# Patient Record
Sex: FEMALE | Race: WHITE | NOT HISPANIC OR LATINO | Employment: UNEMPLOYED | URBAN - METROPOLITAN AREA
[De-identification: names, ages, dates, MRNs, and addresses within clinical notes are randomized per-mention and may not be internally consistent; named-entity substitution may affect disease eponyms.]

---

## 2024-08-11 ENCOUNTER — LAB REQUISITION (OUTPATIENT)
Dept: LAB | Facility: HOSPITAL | Age: 44
End: 2024-08-11

## 2024-08-11 DIAGNOSIS — Z79.899 OTHER LONG TERM (CURRENT) DRUG THERAPY: ICD-10-CM

## 2024-08-11 LAB
AMPHETAMINES UR QL SCN: ABNORMAL
BARBITURATES UR QL SCN: ABNORMAL
BENZODIAZ UR QL SCN: ABNORMAL
BZE UR QL SCN: ABNORMAL
CANNABINOIDS UR QL SCN: ABNORMAL
FENTANYL+NORFENTANYL UR QL SCN: ABNORMAL
METHADONE UR QL SCN: ABNORMAL
OPIATES UR QL SCN: ABNORMAL
OXYCODONE+OXYMORPHONE UR QL SCN: ABNORMAL
PCP UR QL SCN: ABNORMAL

## 2024-08-11 PROCEDURE — 80355 GABAPENTIN NON-BLOOD: CPT | Mod: OUT | Performed by: NURSE PRACTITIONER

## 2024-08-11 PROCEDURE — 80307 DRUG TEST PRSMV CHEM ANLYZR: CPT | Mod: OUT | Performed by: NURSE PRACTITIONER

## 2024-08-12 ENCOUNTER — PHARMACY VISIT (OUTPATIENT)
Dept: PHARMACY | Facility: CLINIC | Age: 44
End: 2024-08-12
Payer: MEDICARE

## 2024-08-12 PROCEDURE — RXMED WILLOW AMBULATORY MEDICATION CHARGE

## 2024-08-12 RX ORDER — DIPHENHYDRAMINE HCL 25 MG
CAPSULE ORAL
Qty: 12 CAPSULE | Refills: 0 | OUTPATIENT
Start: 2024-08-12

## 2024-08-12 RX ORDER — PROMETHAZINE HYDROCHLORIDE 12.5 MG/1
TABLET ORAL
Qty: 20 TABLET | Refills: 0 | OUTPATIENT
Start: 2024-08-12

## 2024-08-12 RX ORDER — LEVOTHYROXINE SODIUM 175 UG/1
175 TABLET ORAL EVERY MORNING
Qty: 7 TABLET | Refills: 0 | OUTPATIENT
Start: 2024-08-12 | End: 2024-08-14 | Stop reason: SDUPTHER

## 2024-08-12 RX ORDER — LEVETIRACETAM 500 MG/1
500 TABLET ORAL 2 TIMES DAILY
Qty: 14 TABLET | Refills: 0 | OUTPATIENT
Start: 2024-08-12

## 2024-08-12 RX ORDER — DICYCLOMINE HYDROCHLORIDE 20 MG/1
TABLET ORAL
Qty: 10 TABLET | Refills: 0 | OUTPATIENT
Start: 2024-08-12

## 2024-08-12 RX ORDER — TALC
POWDER (GRAM) TOPICAL
Qty: 10 TABLET | Refills: 0 | OUTPATIENT
Start: 2024-08-12

## 2024-08-12 RX ORDER — CLONIDINE HYDROCHLORIDE 0.1 MG/1
TABLET ORAL
Qty: 20 TABLET | Refills: 0 | OUTPATIENT
Start: 2024-08-12

## 2024-08-12 RX ORDER — MICONAZOLE NITRATE 2 %
CREAM (GRAM) TOPICAL
Qty: 20 EACH | Refills: 0 | OUTPATIENT
Start: 2024-08-12

## 2024-08-12 RX ORDER — ASPIRIN 81 MG/1
81 TABLET ORAL EVERY MORNING
Qty: 7 TABLET | Refills: 0 | OUTPATIENT
Start: 2024-08-12 | End: 2024-08-14 | Stop reason: SDUPTHER

## 2024-08-12 RX ORDER — ONDANSETRON 4 MG/1
TABLET, ORALLY DISINTEGRATING ORAL
Qty: 10 TABLET | Refills: 0 | OUTPATIENT
Start: 2024-08-12

## 2024-08-12 RX ORDER — TRAZODONE HYDROCHLORIDE 50 MG/1
TABLET ORAL
Qty: 10 TABLET | Refills: 0 | OUTPATIENT
Start: 2024-08-12

## 2024-08-12 RX ORDER — HYDROXYZINE HYDROCHLORIDE 50 MG/1
TABLET, FILM COATED ORAL
Qty: 30 TABLET | Refills: 0 | OUTPATIENT
Start: 2024-08-12

## 2024-08-13 ENCOUNTER — PHARMACY VISIT (OUTPATIENT)
Dept: PHARMACY | Facility: CLINIC | Age: 44
End: 2024-08-13
Payer: MEDICARE

## 2024-08-13 LAB
BUPRENORPHINE SCREEN, URINE: NEGATIVE NG/ML
DRUG SCREEN COMMENT UR-IMP: NORMAL

## 2024-08-13 PROCEDURE — RXMED WILLOW AMBULATORY MEDICATION CHARGE

## 2024-08-13 RX ORDER — LOPERAMIDE HYDROCHLORIDE 2 MG/1
4 CAPSULE ORAL EVERY 6 HOURS PRN
Qty: 16 CAPSULE | Refills: 0 | OUTPATIENT
Start: 2024-08-13

## 2024-08-13 RX ORDER — ROPINIROLE 0.25 MG/1
0.25 TABLET, FILM COATED ORAL 2 TIMES DAILY
Qty: 14 TABLET | Refills: 0 | OUTPATIENT
Start: 2024-08-13

## 2024-08-14 ENCOUNTER — PHARMACY VISIT (OUTPATIENT)
Dept: PHARMACY | Facility: CLINIC | Age: 44
End: 2024-08-14
Payer: MEDICARE

## 2024-08-14 PROCEDURE — RXMED WILLOW AMBULATORY MEDICATION CHARGE

## 2024-08-14 RX ORDER — LEVOTHYROXINE SODIUM 175 UG/1
175 TABLET ORAL EVERY MORNING
Qty: 14 TABLET | Refills: 0 | OUTPATIENT
Start: 2024-08-14

## 2024-08-14 RX ORDER — ASPIRIN 81 MG/1
81 TABLET ORAL EVERY MORNING
Qty: 14 TABLET | Refills: 0 | OUTPATIENT
Start: 2024-08-14

## 2024-08-14 RX ORDER — BUPRENORPHINE AND NALOXONE 8; 2 MG/1; MG/1
0.5 FILM, SOLUBLE BUCCAL; SUBLINGUAL EVERY 6 HOURS PRN
Qty: 6 FILM | Refills: 0 | OUTPATIENT
Start: 2024-08-14

## 2024-08-16 LAB — GABAPENTIN UR-MCNC: 454.4 UG/ML

## 2024-08-17 ENCOUNTER — PHARMACY VISIT (OUTPATIENT)
Dept: PHARMACY | Facility: CLINIC | Age: 44
End: 2024-08-17
Payer: MEDICARE

## 2024-08-17 ENCOUNTER — APPOINTMENT (OUTPATIENT)
Dept: RADIOLOGY | Facility: HOSPITAL | Age: 44
End: 2024-08-17
Payer: COMMERCIAL

## 2024-08-17 ENCOUNTER — APPOINTMENT (OUTPATIENT)
Dept: CARDIOLOGY | Facility: HOSPITAL | Age: 44
End: 2024-08-17
Payer: COMMERCIAL

## 2024-08-17 ENCOUNTER — HOSPITAL ENCOUNTER (EMERGENCY)
Facility: HOSPITAL | Age: 44
Discharge: NURSING, REHAB OR LONG-TERM CARE | End: 2024-08-17
Attending: STUDENT IN AN ORGANIZED HEALTH CARE EDUCATION/TRAINING PROGRAM
Payer: COMMERCIAL

## 2024-08-17 VITALS
WEIGHT: 220 LBS | TEMPERATURE: 97.8 F | DIASTOLIC BLOOD PRESSURE: 97 MMHG | OXYGEN SATURATION: 98 % | HEIGHT: 70 IN | HEART RATE: 61 BPM | BODY MASS INDEX: 31.5 KG/M2 | SYSTOLIC BLOOD PRESSURE: 140 MMHG | RESPIRATION RATE: 16 BRPM

## 2024-08-17 DIAGNOSIS — R07.89 ATYPICAL CHEST PAIN: Primary | ICD-10-CM

## 2024-08-17 LAB
ALBUMIN SERPL BCP-MCNC: 3.6 G/DL (ref 3.4–5)
ALP SERPL-CCNC: 35 U/L (ref 33–110)
ALT SERPL W P-5'-P-CCNC: 35 U/L (ref 7–45)
ANION GAP SERPL CALC-SCNC: 7 MMOL/L (ref 10–20)
AST SERPL W P-5'-P-CCNC: 63 U/L (ref 9–39)
BASOPHILS # BLD AUTO: 0.06 X10*3/UL (ref 0–0.1)
BASOPHILS NFR BLD AUTO: 1.6 %
BILIRUB SERPL-MCNC: 0.5 MG/DL (ref 0–1.2)
BNP SERPL-MCNC: 22 PG/ML (ref 0–99)
BUN SERPL-MCNC: 16 MG/DL (ref 6–23)
BURR CELLS BLD QL SMEAR: NORMAL
CALCIUM SERPL-MCNC: 8.8 MG/DL (ref 8.6–10.3)
CARDIAC TROPONIN I PNL SERPL HS: 5 NG/L (ref 0–13)
CARDIAC TROPONIN I PNL SERPL HS: 6 NG/L (ref 0–13)
CHLORIDE SERPL-SCNC: 105 MMOL/L (ref 98–107)
CO2 SERPL-SCNC: 31 MMOL/L (ref 21–32)
CREAT SERPL-MCNC: 1.27 MG/DL (ref 0.5–1.05)
D DIMER PPP FEU-MCNC: 738 NG/ML FEU
EGFRCR SERPLBLD CKD-EPI 2021: 54 ML/MIN/1.73M*2
EOSINOPHIL # BLD AUTO: 0.04 X10*3/UL (ref 0–0.7)
EOSINOPHIL NFR BLD AUTO: 1 %
ERYTHROCYTE [DISTWIDTH] IN BLOOD BY AUTOMATED COUNT: 22.3 % (ref 11.5–14.5)
GLUCOSE SERPL-MCNC: 77 MG/DL (ref 74–99)
HCT VFR BLD AUTO: 32.4 % (ref 36–46)
HGB BLD-MCNC: 10.2 G/DL (ref 12–16)
HYPOCHROMIA BLD QL SMEAR: NORMAL
IMM GRANULOCYTES # BLD AUTO: 0.01 X10*3/UL (ref 0–0.7)
IMM GRANULOCYTES NFR BLD AUTO: 0.3 % (ref 0–0.9)
LYMPHOCYTES # BLD AUTO: 1.34 X10*3/UL (ref 1.2–4.8)
LYMPHOCYTES NFR BLD AUTO: 34.6 %
MCH RBC QN AUTO: 24.9 PG (ref 26–34)
MCHC RBC AUTO-ENTMCNC: 31.5 G/DL (ref 32–36)
MCV RBC AUTO: 79 FL (ref 80–100)
MONOCYTES # BLD AUTO: 0.26 X10*3/UL (ref 0.1–1)
MONOCYTES NFR BLD AUTO: 6.7 %
NEUTROPHILS # BLD AUTO: 2.16 X10*3/UL (ref 1.2–7.7)
NEUTROPHILS NFR BLD AUTO: 55.8 %
NRBC BLD-RTO: 0 /100 WBCS (ref 0–0)
OVALOCYTES BLD QL SMEAR: NORMAL
PLATELET # BLD AUTO: 188 X10*3/UL (ref 150–450)
POTASSIUM SERPL-SCNC: 3.8 MMOL/L (ref 3.5–5.3)
PROT SERPL-MCNC: 7 G/DL (ref 6.4–8.2)
RBC # BLD AUTO: 4.09 X10*6/UL (ref 4–5.2)
RBC MORPH BLD: NORMAL
SODIUM SERPL-SCNC: 139 MMOL/L (ref 136–145)
WBC # BLD AUTO: 3.9 X10*3/UL (ref 4.4–11.3)

## 2024-08-17 PROCEDURE — 71275 CT ANGIOGRAPHY CHEST: CPT | Performed by: STUDENT IN AN ORGANIZED HEALTH CARE EDUCATION/TRAINING PROGRAM

## 2024-08-17 PROCEDURE — 93005 ELECTROCARDIOGRAM TRACING: CPT

## 2024-08-17 PROCEDURE — 71275 CT ANGIOGRAPHY CHEST: CPT

## 2024-08-17 PROCEDURE — RXMED WILLOW AMBULATORY MEDICATION CHARGE

## 2024-08-17 PROCEDURE — 36415 COLL VENOUS BLD VENIPUNCTURE: CPT | Performed by: PHYSICIAN ASSISTANT

## 2024-08-17 PROCEDURE — 2550000001 HC RX 255 CONTRASTS: Performed by: STUDENT IN AN ORGANIZED HEALTH CARE EDUCATION/TRAINING PROGRAM

## 2024-08-17 PROCEDURE — 71045 X-RAY EXAM CHEST 1 VIEW: CPT | Performed by: RADIOLOGY

## 2024-08-17 PROCEDURE — 84484 ASSAY OF TROPONIN QUANT: CPT | Performed by: PHYSICIAN ASSISTANT

## 2024-08-17 PROCEDURE — 2500000001 HC RX 250 WO HCPCS SELF ADMINISTERED DRUGS (ALT 637 FOR MEDICARE OP): Performed by: PHYSICIAN ASSISTANT

## 2024-08-17 PROCEDURE — 83880 ASSAY OF NATRIURETIC PEPTIDE: CPT | Performed by: PHYSICIAN ASSISTANT

## 2024-08-17 PROCEDURE — 99285 EMERGENCY DEPT VISIT HI MDM: CPT | Mod: 25

## 2024-08-17 PROCEDURE — 85379 FIBRIN DEGRADATION QUANT: CPT | Performed by: PHYSICIAN ASSISTANT

## 2024-08-17 PROCEDURE — 80053 COMPREHEN METABOLIC PANEL: CPT | Performed by: PHYSICIAN ASSISTANT

## 2024-08-17 PROCEDURE — 85025 COMPLETE CBC W/AUTO DIFF WBC: CPT | Performed by: PHYSICIAN ASSISTANT

## 2024-08-17 PROCEDURE — 71045 X-RAY EXAM CHEST 1 VIEW: CPT

## 2024-08-17 RX ORDER — NAPROXEN SODIUM 220 MG/1
81 TABLET, FILM COATED ORAL EVERY MORNING
Qty: 21 TABLET | Refills: 0 | OUTPATIENT
Start: 2024-08-17

## 2024-08-17 RX ORDER — LEVOTHYROXINE SODIUM 175 UG/1
175 TABLET ORAL EVERY MORNING
Qty: 21 TABLET | Refills: 0 | OUTPATIENT
Start: 2024-08-17

## 2024-08-17 RX ORDER — NAPROXEN SODIUM 220 MG/1
324 TABLET, FILM COATED ORAL ONCE
Status: COMPLETED | OUTPATIENT
Start: 2024-08-17 | End: 2024-08-17

## 2024-08-17 RX ORDER — BUPRENORPHINE AND NALOXONE 8; 2 MG/1; MG/1
1 FILM, SOLUBLE BUCCAL; SUBLINGUAL 2 TIMES DAILY
Qty: 14 FILM | Refills: 0 | OUTPATIENT
Start: 2024-08-17

## 2024-08-17 RX ADMIN — IOHEXOL 75 ML: 350 INJECTION, SOLUTION INTRAVENOUS at 15:54

## 2024-08-17 RX ADMIN — ASPIRIN 81 MG CHEWABLE TABLET 324 MG: 81 TABLET CHEWABLE at 10:57

## 2024-08-17 ASSESSMENT — PAIN DESCRIPTION - ORIENTATION: ORIENTATION: LEFT

## 2024-08-17 ASSESSMENT — LIFESTYLE VARIABLES
TOTAL SCORE: 0
EVER HAD A DRINK FIRST THING IN THE MORNING TO STEADY YOUR NERVES TO GET RID OF A HANGOVER: NO
HAVE PEOPLE ANNOYED YOU BY CRITICIZING YOUR DRINKING: NO
HAVE YOU EVER FELT YOU SHOULD CUT DOWN ON YOUR DRINKING: NO
EVER FELT BAD OR GUILTY ABOUT YOUR DRINKING: NO

## 2024-08-17 ASSESSMENT — HEART SCORE
HEART SCORE: 1
RISK FACTORS: 1-2 RISK FACTORS
TROPONIN: LESS THAN OR EQUAL TO NORMAL LIMIT
HISTORY: SLIGHTLY SUSPICIOUS
ECG: NORMAL
AGE: <45

## 2024-08-17 ASSESSMENT — PAIN - FUNCTIONAL ASSESSMENT: PAIN_FUNCTIONAL_ASSESSMENT: 0-10

## 2024-08-17 ASSESSMENT — COLUMBIA-SUICIDE SEVERITY RATING SCALE - C-SSRS
1. IN THE PAST MONTH, HAVE YOU WISHED YOU WERE DEAD OR WISHED YOU COULD GO TO SLEEP AND NOT WAKE UP?: NO
2. HAVE YOU ACTUALLY HAD ANY THOUGHTS OF KILLING YOURSELF?: NO
6. HAVE YOU EVER DONE ANYTHING, STARTED TO DO ANYTHING, OR PREPARED TO DO ANYTHING TO END YOUR LIFE?: NO

## 2024-08-17 ASSESSMENT — PAIN DESCRIPTION - PROGRESSION: CLINICAL_PROGRESSION: NOT CHANGED

## 2024-08-17 ASSESSMENT — PAIN SCALES - GENERAL: PAINLEVEL_OUTOF10: 6

## 2024-08-17 ASSESSMENT — PAIN DESCRIPTION - FREQUENCY: FREQUENCY: CONSTANT/CONTINUOUS

## 2024-08-17 ASSESSMENT — PAIN DESCRIPTION - ONSET: ONSET: ONGOING

## 2024-08-17 ASSESSMENT — PAIN DESCRIPTION - LOCATION: LOCATION: CHEST

## 2024-08-17 NOTE — ED PROVIDER NOTES
HPI   Chief Complaint   Patient presents with    Chest Pain     Previous MI woke up with pain       History of present illness: 44-year-old female With history of asthma, hepatitis C, depression, thyroid neoplasm, polysubstance abuse, seizures complains of chest pain this morning.  Patient states that he woke up with pressure in her left chest rating to her midsternal region.  Patient states the pain was 6 out of 10.  Patient states she has no pain at the moment.  The pain is intermittent.  She indicates that she has had a myocardial infarction in the past.  She is currently admitted for detox for methamphetamine and fentanyl.  She has been in the chemical dependency unit for about 6 days with a plan of being discharged in 2 days.  Patient is on a buprenorphine protocol with planning going home with Suboxone.  Patient states that it is most of her withdrawal symptoms have improved.  She currently has some congestion lacrimation.  Denies abdominal pain nausea vomiting diarrhea constipation dysuria polyuria vaginal bleeding vaginal discharge leg pain leg swelling history of DVT or PE, hormone therapy, hemoptysis.  Patient states that she has been sleepy during her stay.  She attributes this primarily to not sleeping for 3 days prior to admission.      Review of systems: Constitutional, eye, ENT, cardiovascular, respiratory, gastrointestinal, genitourinary, neurologic, musculoskeletal, dermatologic, hematologic, endocrine systems were evaluated and were negative unless otherwise specified in history of present illness.    Medications: Reviewed and per nursing note.    Family history: Denies relevant medical conditions.    Past medical history: asthma, hepatitis C, depression, thyroid neoplasm, polysubstance abuse, seizures     Social history: Regularly uses methamphetamine and fentanyl IV.  Occasional alcohol.  Denies smoking cigarettes.      Physical exam:    Appearance: Well-developed, well-nourished,  nontoxic-appearing, alert and oriented x3. Talking in complete sentences.    HEENT:  Head normocephalic atraumatic, extraocular movements intact, pupils equal round reactive to light, mucous membranes are moist and pink.    NECK:  Nml Inspection, no meningismus, no thyromegaly, no lymphadenopathy, no JVD, trachea is midline.    Respiratory: Clear to auscultation bilaterally with normal bilateral excursion. No wheezes, rhonchi, crackles.    Cardiovascular: Regular rate and rhythm, no murmurs, rubs or gallops. Pulses 2+ symmetrically in the dorsalis pedis and radial pulses.    Abdomen/GI:  Soft, nontender, nondistended, normal bowel sounds x4. No masses or organomegaly.    :  No CVA tenderness    Neuro:  Oriented x 3, Speech Clear, cranial nerves grossly intact. Normal sensation to light touch in all 4 extremities.    Musculoskeletal: Patient spontaneously moves all 4 extremities.  No calf tenderness.  Negative Homans' sign.    Skin:  No cyanosis, clubbing, edema, open wounds, or rashes.              Patient History   No past medical history on file.  No past surgical history on file.  No family history on file.  Social History     Tobacco Use    Smoking status: Not on file    Smokeless tobacco: Not on file   Substance Use Topics    Alcohol use: Not on file    Drug use: Not on file       Physical Exam   ED Triage Vitals [08/17/24 1025]   Temperature Heart Rate Respirations BP   36.6 °C (97.8 °F) 63 18 118/83      Pulse Ox Temp Source Heart Rate Source Patient Position   100 % Skin -- Sitting      BP Location FiO2 (%)     Left arm --       Physical Exam      ED Course & [facility]   ED Course as of 08/17/24 1713   Sat Aug 17, 2024   1039 EKG: Sinus rhythm at a rate of 57 bpm with no ST segment elevation or ectopy.  OK interval 122 with a QTc 439.  No clear acute ischemia.  This is interpreted by myself. [SK]      ED Course User Index  [SK] Justin Adhikari PA-C         Diagnoses as of 08/17/24 1713   Atypical chest pain                  No data recorded     Kelsey Coma Scale Score: 15 (08/17/24 1029 : Inocente Dukes, EMT) HEART Score: 1 (08/17/24 1100 : Justin Adhikari PA-C)                         Medical Decision Making  Labs Reviewed  CBC WITH AUTO DIFFERENTIAL - Abnormal     WBC                           3.9 (*)                nRBC                          0.0                    RBC                           4.09                   Hemoglobin                    10.2 (*)               Hematocrit                    32.4 (*)               MCV                           79 (*)                 MCH                           24.9 (*)               MCHC                          31.5 (*)               RDW                           22.3 (*)               Platelets                     188                    Neutrophils %                 55.8                   Immature Granulocytes %, Automated   0.3                    Lymphocytes %                 34.6                   Monocytes %                   6.7                    Eosinophils %                 1.0                    Basophils %                   1.6                    Neutrophils Absolute          2.16                   Immature Granulocytes Absolute, Au*   0.01                   Lymphocytes Absolute          1.34                   Monocytes Absolute            0.26                   Eosinophils Absolute          0.04                   Basophils Absolute            0.06                COMPREHENSIVE METABOLIC PANEL - Abnormal     Glucose                       77                     Sodium                        139                    Potassium                     3.8                    Chloride                      105                    Bicarbonate                   31                     Anion Gap                     7 (*)                  Urea Nitrogen                 16                     Creatinine                    1.27 (*)               eGFR                          54 (*)                  Calcium                       8.8                    Albumin                       3.6                    Alkaline Phosphatase          35                     Total Protein                 7.0                    AST                           63 (*)                 Bilirubin, Total              0.5                    ALT                           35                  D-DIMER, NON VTE - Abnormal     D-Dimer Non VTE, Quant (ng/mL FEU)   738 (*)                    Narrative: The D-Dimer assay is reported in ng/mL Fibrinogen Equivalent Units (FEU). The results of this assay should NOT be used for the exclusion of Deep Vein Thrombosis and/or Pulmonary Embolism.  B-TYPE NATRIURETIC PEPTIDE - Normal     BNP                           22                         Narrative:    <100 pg/mL - Heart failure unlikely                  100-299 pg/mL - Intermediate probability of acute heart                                  failure exacerbation. Correlate with clinical                                  context and patient history.                    >=300 pg/mL - Heart Failure likely. Correlate with clinical                                  context and patient history.                                    BNP testing is performed using different testing methodology at [facility] than at other [facility] hospitals. Direct result comparisons should only be made within the same method.                     SERIAL TROPONIN-INITIAL - Normal     Troponin I, High Sensitivity   6                          Narrative: Less than 99th percentile of normal range cutoff-                  Female and children under 18 years old <14 ng/L; Male <21 ng/L: Negative                  Repeat testing should be performed if clinically indicated.                                     Female and children under 18 years old 14-50 ng/L; Male 21-50 ng/L:                  Consistent with possible cardiac damage and possible increased clinical                    risk. Serial measurements may help to assess extent of myocardial damage.                                     >50 ng/L: Consistent with cardiac damage, increased clinical risk and                  myocardial infarction. Serial measurements may help assess extent of                   myocardial damage.                                      NOTE: Children less than 1 year old may have higher baseline troponin                   levels and results should be interpreted in conjunction with the overall                   clinical context.                                     NOTE: Troponin I testing is performed using a different                   testing methodology at [facility] than at other                   [facility]. Direct result comparisons should only                   be made within the same method.  SERIAL TROPONIN, 1 HOUR - Normal     Troponin I, High Sensitivity   5                          Narrative: Less than 99th percentile of normal range cutoff-                  Female and children under 18 years old <14 ng/L; Male <21 ng/L: Negative                  Repeat testing should be performed if clinically indicated.                                     Female and children under 18 years old 14-50 ng/L; Male 21-50 ng/L:                  Consistent with possible cardiac damage and possible increased clinical                   risk. Serial measurements may help to assess extent of myocardial damage.                                     >50 ng/L: Consistent with cardiac damage, increased clinical risk and                  myocardial infarction. Serial measurements may help assess extent of                   myocardial damage.                                      NOTE: Children less than 1 year old may have higher baseline troponin                   levels and results should be interpreted in conjunction with the overall                   clinical context.                                      NOTE: Troponin I testing is performed using a different                   testing methodology at [facility] than at other                   system hospitals. Direct result comparisons should only                   be made within the same method.  TROPONIN SERIES- (INITIAL, 1 HR)         Narrative: The following orders were created for panel order Troponin I Series, High Sensitivity (0, 1 HR).                  Procedure                               Abnormality         Status                                     ---------                               -----------         ------                                     Troponin I, High Sensiti...[[id number]]  Normal              Final result                               Troponin, High Sensitivi...[[id number]]  Normal              Final result                                                 Please view results for these tests on the individual orders.  MORPHOLOGY    CT angio chest for pulmonary embolism   Final Result    1.  No evidence of pulmonary emboli to the proximal segmental level.    Evaluation of the distal segmental and subsegmental branches is    limited due to suboptimal contrast bolus and mixing artifact.    2. Mosaic attenuation of the lower lung fields which may be due to    atelectasis, air trapping, or small-vessel disease. Mildly enlarged    main pulmonary trunk measuring 3.2 cm in caliber raising possibility    of pulmonary arterial hypertension.                Signed by: Shaquille Medina 8/17/2024 5:01 PM    Dictation workstation:   [id number]     XR chest 1 view   Final Result    Likely atelectasis and/or linear scarring the left lung base.    Otherwise no focal active disease.          MACRO:    None          Signed by: Kristian Menjivar 8/17/2024 1:03 PM    Dictation workstation:   [id number]         44-year-old female complains of chest pain this morning.  Differential diagnosis of anxiety, costochondritis, acute coronary syndrome, myocardial  infarction, pneumothorax, pulmonary embolism, pleuritis, URI.  Patient reports chest pain this morning is pressure-like and intermittent.  Has no pain at the moment.  History of MI per patient.  Low risk Wells criteria, PE unlikely.    CBC CMP cardiac enzymes with repeat 1 hour chest x-ray ordered.  Given baby aspirin oral.  Patient has no pain at this moment.  Pt reported some leg pain and ddimer ordered.    Diagnostic studies reveal normal troponin x 2, EKG unremarkable , Chemistry with anion gap 7 creatinine 1.27 AST 63 with normal electrolytes liver renal function otherwise, CBC with a white blood cell count 3.9 with hemoglobin 10.2, BNP normal D-dimer elevated 738.  Chest x-ray shows no acute findings.    CT PE study shows no evidence of pulmonary embolism.  There is some mosaic attenuation lower lung fields which may be atelectasis air trapping or small vessel disease.  Mildly enlarged pulmonary trunk 3.2 cm with possibility of underlying pulmonary hypertension.    Patient has remained pain-free for hours.  She feels better and would like to be discharged, which would likely be placed back to chemical dependency floor for completion of detox.    Patient will be discharged.  Patient is educated in signs and symptoms of worsening symptoms and reasons to come back to the emergency department.  Will need to follow up with Cardiology.  Patient does not report social determinants of health impacting ability to obtain care that is needed.  Patient agrees with plan.    This is a transcription.  Text was reviewed for errors, but some transcription errors may remain.  Please call for any questions.              Procedure  Procedures     Justin Adhikari PA-C  08/17/24 2231

## 2024-08-18 ENCOUNTER — PHARMACY VISIT (OUTPATIENT)
Dept: PHARMACY | Facility: CLINIC | Age: 44
End: 2024-08-18
Payer: MEDICARE

## 2024-08-18 PROCEDURE — RXMED WILLOW AMBULATORY MEDICATION CHARGE

## 2024-08-19 ENCOUNTER — PHARMACY VISIT (OUTPATIENT)
Dept: PHARMACY | Facility: CLINIC | Age: 44
End: 2024-08-19
Payer: MEDICARE

## 2024-08-19 PROCEDURE — RXMED WILLOW AMBULATORY MEDICATION CHARGE

## 2024-08-19 RX ORDER — LEVOTHYROXINE SODIUM 175 UG/1
175 TABLET ORAL EVERY MORNING
Qty: 21 TABLET | Refills: 0 | OUTPATIENT
Start: 2024-08-19

## 2024-08-19 RX ORDER — LEVETIRACETAM 500 MG/1
500 TABLET ORAL 2 TIMES DAILY
Qty: 14 TABLET | Refills: 0 | OUTPATIENT
Start: 2024-08-19 | End: 2024-08-25 | Stop reason: SDUPTHER

## 2024-08-20 ENCOUNTER — PHARMACY VISIT (OUTPATIENT)
Dept: PHARMACY | Facility: CLINIC | Age: 44
End: 2024-08-20
Payer: MEDICARE

## 2024-08-20 PROCEDURE — RXMED WILLOW AMBULATORY MEDICATION CHARGE

## 2024-08-20 RX ORDER — ROPINIROLE 0.25 MG/1
0.25 TABLET, FILM COATED ORAL 2 TIMES DAILY
Qty: 14 TABLET | Refills: 0 | OUTPATIENT
Start: 2024-08-20 | End: 2024-08-21 | Stop reason: SDUPTHER

## 2024-08-21 ENCOUNTER — LAB REQUISITION (OUTPATIENT)
Dept: LAB | Facility: HOSPITAL | Age: 44
End: 2024-08-21
Payer: COMMERCIAL

## 2024-08-21 DIAGNOSIS — Z79.899 OTHER LONG TERM (CURRENT) DRUG THERAPY: ICD-10-CM

## 2024-08-21 PROCEDURE — 80307 DRUG TEST PRSMV CHEM ANLYZR: CPT | Mod: OUT | Performed by: NURSE PRACTITIONER

## 2024-08-21 PROCEDURE — 80355 GABAPENTIN NON-BLOOD: CPT | Mod: OUT | Performed by: NURSE PRACTITIONER

## 2024-08-21 PROCEDURE — 80348 DRUG SCREENING BUPRENORPHINE: CPT | Mod: OUT | Performed by: NURSE PRACTITIONER

## 2024-08-22 LAB
ATRIAL RATE: 0 BPM
P AXIS: 5 DEGREES
PR INTERVAL: 122 MS
Q ONSET: 252 MS
QRS COUNT: 9 BEATS
QRS DURATION: 97 MS
QT INTERVAL: 450 MS
QTC CALCULATION(BAZETT): 439 MS
QTC FREDERICIA: 442 MS
R AXIS: 3 DEGREES
T OFFSET: 477 MS
VENTRICULAR RATE: 57 BPM

## 2024-08-23 ENCOUNTER — LAB REQUISITION (OUTPATIENT)
Dept: LAB | Facility: HOSPITAL | Age: 44
End: 2024-08-23
Payer: COMMERCIAL

## 2024-08-23 DIAGNOSIS — Z20.828 CONTACT WITH AND (SUSPECTED) EXPOSURE TO OTHER VIRAL COMMUNICABLE DISEASES: ICD-10-CM

## 2024-08-23 DIAGNOSIS — F11.20 OPIOID DEPENDENCE, UNCOMPLICATED (MULTI): ICD-10-CM

## 2024-08-23 DIAGNOSIS — Z00.00 ENCOUNTER FOR GENERAL ADULT MEDICAL EXAMINATION WITHOUT ABNORMAL FINDINGS: ICD-10-CM

## 2024-08-23 DIAGNOSIS — Z79.899 OTHER LONG TERM (CURRENT) DRUG THERAPY: ICD-10-CM

## 2024-08-23 LAB
ACANTHOCYTES BLD QL SMEAR: NORMAL
ALBUMIN SERPL BCP-MCNC: 4 G/DL (ref 3.4–5)
ALP SERPL-CCNC: 52 U/L (ref 33–110)
ALT SERPL W P-5'-P-CCNC: 45 U/L (ref 7–45)
ANION GAP SERPL CALC-SCNC: 10 MMOL/L (ref 10–20)
AST SERPL W P-5'-P-CCNC: 65 U/L (ref 9–39)
BASOPHILS # BLD AUTO: 0.05 X10*3/UL (ref 0–0.1)
BASOPHILS NFR BLD AUTO: 1.1 %
BILIRUB SERPL-MCNC: 0.6 MG/DL (ref 0–1.2)
BUN SERPL-MCNC: 19 MG/DL (ref 6–23)
BUPRENORPHINE SCREEN, URINE: NORMAL NG/ML
CALCIUM SERPL-MCNC: 8.6 MG/DL (ref 8.6–10.3)
CHLORIDE SERPL-SCNC: 104 MMOL/L (ref 98–107)
CO2 SERPL-SCNC: 28 MMOL/L (ref 21–32)
CREAT SERPL-MCNC: 1.1 MG/DL (ref 0.5–1.05)
DRUG SCREEN COMMENT UR-IMP: NORMAL
EGFRCR SERPLBLD CKD-EPI 2021: 64 ML/MIN/1.73M*2
EOSINOPHIL # BLD AUTO: 0.07 X10*3/UL (ref 0–0.7)
EOSINOPHIL NFR BLD AUTO: 1.5 %
ERYTHROCYTE [DISTWIDTH] IN BLOOD BY AUTOMATED COUNT: 21.9 % (ref 11.5–14.5)
ETHANOL SERPL-MCNC: <10 MG/DL
GLUCOSE SERPL-MCNC: 82 MG/DL (ref 74–99)
HAV IGM SER QL: NONREACTIVE
HBV CORE IGM SER QL: NONREACTIVE
HBV SURFACE AG SERPL QL IA: NONREACTIVE
HCT VFR BLD AUTO: 32 % (ref 36–46)
HCV AB SER QL: REACTIVE
HGB BLD-MCNC: 10 G/DL (ref 12–16)
HIV 1+2 AB+HIV1 P24 AG SERPL QL IA: NONREACTIVE
IMM GRANULOCYTES # BLD AUTO: 0.02 X10*3/UL (ref 0–0.7)
IMM GRANULOCYTES NFR BLD AUTO: 0.4 % (ref 0–0.9)
LYMPHOCYTES # BLD AUTO: 1.2 X10*3/UL (ref 1.2–4.8)
LYMPHOCYTES NFR BLD AUTO: 25.8 %
MCH RBC QN AUTO: 25 PG (ref 26–34)
MCHC RBC AUTO-ENTMCNC: 31.3 G/DL (ref 32–36)
MCV RBC AUTO: 80 FL (ref 80–100)
MONOCYTES # BLD AUTO: 0.39 X10*3/UL (ref 0.1–1)
MONOCYTES NFR BLD AUTO: 8.4 %
NEUTROPHILS # BLD AUTO: 2.93 X10*3/UL (ref 1.2–7.7)
NEUTROPHILS NFR BLD AUTO: 62.8 %
NRBC BLD-RTO: 0 /100 WBCS (ref 0–0)
OVALOCYTES BLD QL SMEAR: NORMAL
PLATELET # BLD AUTO: 245 X10*3/UL (ref 150–450)
POTASSIUM SERPL-SCNC: 4.2 MMOL/L (ref 3.5–5.3)
PROT SERPL-MCNC: 7.5 G/DL (ref 6.4–8.2)
RBC # BLD AUTO: 4 X10*6/UL (ref 4–5.2)
RBC MORPH BLD: NORMAL
SARS-COV-2 RNA RESP QL NAA+PROBE: NOT DETECTED
SODIUM SERPL-SCNC: 138 MMOL/L (ref 136–145)
TREPONEMA PALLIDUM IGG+IGM AB [PRESENCE] IN SERUM OR PLASMA BY IMMUNOASSAY: NONREACTIVE
WBC # BLD AUTO: 4.7 X10*3/UL (ref 4.4–11.3)

## 2024-08-23 PROCEDURE — 86780 TREPONEMA PALLIDUM: CPT | Mod: OUT,PORLAB | Performed by: NURSE PRACTITIONER

## 2024-08-23 PROCEDURE — 86481 TB AG RESPONSE T-CELL SUSP: CPT | Mod: OUT | Performed by: NURSE PRACTITIONER

## 2024-08-23 PROCEDURE — 85025 COMPLETE CBC W/AUTO DIFF WBC: CPT | Mod: OUT | Performed by: NURSE PRACTITIONER

## 2024-08-23 PROCEDURE — 80074 ACUTE HEPATITIS PANEL: CPT | Mod: OUT,PORLAB | Performed by: NURSE PRACTITIONER

## 2024-08-23 PROCEDURE — 82077 ASSAY SPEC XCP UR&BREATH IA: CPT | Mod: OUT | Performed by: NURSE PRACTITIONER

## 2024-08-23 PROCEDURE — 87522 HEPATITIS C REVRS TRNSCRPJ: CPT | Mod: OUT,PORLAB | Performed by: NURSE PRACTITIONER

## 2024-08-23 PROCEDURE — 87389 HIV-1 AG W/HIV-1&-2 AB AG IA: CPT | Mod: OUT,PORLAB | Performed by: NURSE PRACTITIONER

## 2024-08-23 PROCEDURE — 80053 COMPREHEN METABOLIC PANEL: CPT | Mod: OUT | Performed by: NURSE PRACTITIONER

## 2024-08-23 PROCEDURE — 87635 SARS-COV-2 COVID-19 AMP PRB: CPT

## 2024-08-23 PROCEDURE — 36415 COLL VENOUS BLD VENIPUNCTURE: CPT | Performed by: NURSE PRACTITIONER

## 2024-08-24 ENCOUNTER — PHARMACY VISIT (OUTPATIENT)
Dept: PHARMACY | Facility: CLINIC | Age: 44
End: 2024-08-24
Payer: MEDICARE

## 2024-08-24 PROCEDURE — RXMED WILLOW AMBULATORY MEDICATION CHARGE

## 2024-08-24 RX ORDER — GABAPENTIN 600 MG/1
600 TABLET ORAL 3 TIMES DAILY
Qty: 42 TABLET | Refills: 0 | OUTPATIENT
Start: 2024-08-24

## 2024-08-25 ENCOUNTER — PHARMACY VISIT (OUTPATIENT)
Dept: PHARMACY | Facility: CLINIC | Age: 44
End: 2024-08-25
Payer: MEDICARE

## 2024-08-25 LAB
HCV RNA SERPL NAA+PROBE-ACNC: ABNORMAL IU/ML
HCV RNA SERPL NAA+PROBE-ACNC: DETECTED K[IU]/ML
HCV RNA SERPL NAA+PROBE-LOG IU: 5.85 LOG IU/ML

## 2024-08-25 PROCEDURE — RXMED WILLOW AMBULATORY MEDICATION CHARGE

## 2024-08-25 RX ORDER — LEVETIRACETAM 500 MG/1
500 TABLET ORAL 2 TIMES DAILY
Qty: 30 TABLET | Refills: 0 | OUTPATIENT
Start: 2024-08-25

## 2024-08-26 LAB
BUPRENORPHINE UR-MCNC: 37 NG/ML
BUPRENORPHINE UR-MCNC: <2 NG/ML
NALOXONE UR CFM-MCNC: <100 NG/ML
NORBUPRENORPHINE UR CFM-MCNC: 495 NG/ML
NORBUPRENORPHINE UR-MCNC: 51 NG/ML

## 2024-08-27 ENCOUNTER — HOSPITAL ENCOUNTER (EMERGENCY)
Facility: HOSPITAL | Age: 44
Discharge: HOME | End: 2024-08-28
Attending: EMERGENCY MEDICINE
Payer: COMMERCIAL

## 2024-08-27 ENCOUNTER — PHARMACY VISIT (OUTPATIENT)
Dept: PHARMACY | Facility: CLINIC | Age: 44
End: 2024-08-27
Payer: MEDICARE

## 2024-08-27 DIAGNOSIS — R10.9 FLANK PAIN: Primary | ICD-10-CM

## 2024-08-27 DIAGNOSIS — N39.0 URINARY TRACT INFECTION WITHOUT HEMATURIA, SITE UNSPECIFIED: ICD-10-CM

## 2024-08-27 LAB — GABAPENTIN UR-MCNC: >500 UG/ML

## 2024-08-27 PROCEDURE — 85025 COMPLETE CBC W/AUTO DIFF WBC: CPT | Performed by: PHYSICIAN ASSISTANT

## 2024-08-27 PROCEDURE — 81001 URINALYSIS AUTO W/SCOPE: CPT | Performed by: PHYSICIAN ASSISTANT

## 2024-08-27 PROCEDURE — 87086 URINE CULTURE/COLONY COUNT: CPT | Mod: PORLAB | Performed by: PHYSICIAN ASSISTANT

## 2024-08-27 PROCEDURE — RXMED WILLOW AMBULATORY MEDICATION CHARGE

## 2024-08-27 PROCEDURE — 36415 COLL VENOUS BLD VENIPUNCTURE: CPT | Performed by: PHYSICIAN ASSISTANT

## 2024-08-27 PROCEDURE — 84075 ASSAY ALKALINE PHOSPHATASE: CPT | Performed by: PHYSICIAN ASSISTANT

## 2024-08-27 PROCEDURE — 81025 URINE PREGNANCY TEST: CPT | Performed by: PHYSICIAN ASSISTANT

## 2024-08-27 PROCEDURE — 99284 EMERGENCY DEPT VISIT MOD MDM: CPT

## 2024-08-27 RX ORDER — ROPINIROLE 0.25 MG/1
0.25 TABLET, FILM COATED ORAL 2 TIMES DAILY
Qty: 14 TABLET | Refills: 0 | OUTPATIENT
Start: 2024-08-27

## 2024-08-27 ASSESSMENT — COLUMBIA-SUICIDE SEVERITY RATING SCALE - C-SSRS
6. HAVE YOU EVER DONE ANYTHING, STARTED TO DO ANYTHING, OR PREPARED TO DO ANYTHING TO END YOUR LIFE?: NO
2. HAVE YOU ACTUALLY HAD ANY THOUGHTS OF KILLING YOURSELF?: NO
1. IN THE PAST MONTH, HAVE YOU WISHED YOU WERE DEAD OR WISHED YOU COULD GO TO SLEEP AND NOT WAKE UP?: NO

## 2024-08-27 ASSESSMENT — PAIN DESCRIPTION - ORIENTATION: ORIENTATION: RIGHT

## 2024-08-27 ASSESSMENT — PAIN - FUNCTIONAL ASSESSMENT: PAIN_FUNCTIONAL_ASSESSMENT: 0-10

## 2024-08-27 ASSESSMENT — PAIN DESCRIPTION - LOCATION: LOCATION: BACK

## 2024-08-27 ASSESSMENT — PAIN DESCRIPTION - PAIN TYPE: TYPE: ACUTE PAIN

## 2024-08-27 ASSESSMENT — PAIN SCALES - GENERAL: PAINLEVEL_OUTOF10: 7

## 2024-08-28 ENCOUNTER — APPOINTMENT (OUTPATIENT)
Dept: RADIOLOGY | Facility: HOSPITAL | Age: 44
End: 2024-08-28
Payer: COMMERCIAL

## 2024-08-28 VITALS
SYSTOLIC BLOOD PRESSURE: 122 MMHG | BODY MASS INDEX: 31.5 KG/M2 | RESPIRATION RATE: 16 BRPM | TEMPERATURE: 98.1 F | HEART RATE: 70 BPM | DIASTOLIC BLOOD PRESSURE: 70 MMHG | HEIGHT: 70 IN | WEIGHT: 220 LBS | OXYGEN SATURATION: 99 %

## 2024-08-28 LAB
ACANTHOCYTES BLD QL SMEAR: NORMAL
ALBUMIN SERPL BCP-MCNC: 3.9 G/DL (ref 3.4–5)
ALP SERPL-CCNC: 47 U/L (ref 33–110)
ALT SERPL W P-5'-P-CCNC: 44 U/L (ref 7–45)
ANION GAP SERPL CALC-SCNC: 8 MMOL/L (ref 10–20)
APPEARANCE UR: CLEAR
AST SERPL W P-5'-P-CCNC: 57 U/L (ref 9–39)
BASOPHILS # BLD AUTO: 0.05 X10*3/UL (ref 0–0.1)
BASOPHILS NFR BLD AUTO: 1.1 %
BILIRUB SERPL-MCNC: 0.4 MG/DL (ref 0–1.2)
BILIRUB UR STRIP.AUTO-MCNC: NEGATIVE MG/DL
BUN SERPL-MCNC: 23 MG/DL (ref 6–23)
CALCIUM SERPL-MCNC: 8.4 MG/DL (ref 8.6–10.3)
CHLORIDE SERPL-SCNC: 105 MMOL/L (ref 98–107)
CO2 SERPL-SCNC: 28 MMOL/L (ref 21–32)
COLOR UR: YELLOW
CREAT SERPL-MCNC: 1.15 MG/DL (ref 0.5–1.05)
EGFRCR SERPLBLD CKD-EPI 2021: 61 ML/MIN/1.73M*2
EOSINOPHIL # BLD AUTO: 0.04 X10*3/UL (ref 0–0.7)
EOSINOPHIL NFR BLD AUTO: 0.8 %
ERYTHROCYTE [DISTWIDTH] IN BLOOD BY AUTOMATED COUNT: 21.8 % (ref 11.5–14.5)
GLUCOSE SERPL-MCNC: 97 MG/DL (ref 74–99)
GLUCOSE UR STRIP.AUTO-MCNC: NORMAL MG/DL
HCG UR QL IA.RAPID: NEGATIVE
HCT VFR BLD AUTO: 26.8 % (ref 36–46)
HGB BLD-MCNC: 8.3 G/DL (ref 12–16)
HOLD SPECIMEN: NORMAL
IMM GRANULOCYTES # BLD AUTO: 0.02 X10*3/UL (ref 0–0.7)
IMM GRANULOCYTES NFR BLD AUTO: 0.4 % (ref 0–0.9)
KETONES UR STRIP.AUTO-MCNC: NEGATIVE MG/DL
LEUKOCYTE ESTERASE UR QL STRIP.AUTO: ABNORMAL
LYMPHOCYTES # BLD AUTO: 1.31 X10*3/UL (ref 1.2–4.8)
LYMPHOCYTES NFR BLD AUTO: 27.6 %
MCH RBC QN AUTO: 25.3 PG (ref 26–34)
MCHC RBC AUTO-ENTMCNC: 31 G/DL (ref 32–36)
MCV RBC AUTO: 82 FL (ref 80–100)
MONOCYTES # BLD AUTO: 0.38 X10*3/UL (ref 0.1–1)
MONOCYTES NFR BLD AUTO: 8 %
NEUTROPHILS # BLD AUTO: 2.95 X10*3/UL (ref 1.2–7.7)
NEUTROPHILS NFR BLD AUTO: 62.1 %
NITRITE UR QL STRIP.AUTO: NEGATIVE
NRBC BLD-RTO: 0 /100 WBCS (ref 0–0)
OVALOCYTES BLD QL SMEAR: NORMAL
PH UR STRIP.AUTO: 6 [PH]
PLATELET # BLD AUTO: 240 X10*3/UL (ref 150–450)
POTASSIUM SERPL-SCNC: 3.9 MMOL/L (ref 3.5–5.3)
PROT SERPL-MCNC: 7.3 G/DL (ref 6.4–8.2)
PROT UR STRIP.AUTO-MCNC: ABNORMAL MG/DL
RBC # BLD AUTO: 3.28 X10*6/UL (ref 4–5.2)
RBC # UR STRIP.AUTO: NEGATIVE /UL
RBC #/AREA URNS AUTO: ABNORMAL /HPF
RBC MORPH BLD: NORMAL
SODIUM SERPL-SCNC: 137 MMOL/L (ref 136–145)
SP GR UR STRIP.AUTO: 1.03
SQUAMOUS #/AREA URNS AUTO: ABNORMAL /HPF
UROBILINOGEN UR STRIP.AUTO-MCNC: ABNORMAL MG/DL
WBC # BLD AUTO: 4.8 X10*3/UL (ref 4.4–11.3)
WBC #/AREA URNS AUTO: ABNORMAL /HPF

## 2024-08-28 PROCEDURE — 2500000002 HC RX 250 W HCPCS SELF ADMINISTERED DRUGS (ALT 637 FOR MEDICARE OP, ALT 636 FOR OP/ED): Performed by: EMERGENCY MEDICINE

## 2024-08-28 PROCEDURE — 74176 CT ABD & PELVIS W/O CONTRAST: CPT | Performed by: RADIOLOGY

## 2024-08-28 PROCEDURE — RXMED WILLOW AMBULATORY MEDICATION CHARGE

## 2024-08-28 PROCEDURE — 74176 CT ABD & PELVIS W/O CONTRAST: CPT

## 2024-08-28 PROCEDURE — 2500000002 HC RX 250 W HCPCS SELF ADMINISTERED DRUGS (ALT 637 FOR MEDICARE OP, ALT 636 FOR OP/ED)

## 2024-08-28 RX ORDER — NITROFURANTOIN 25; 75 MG/1; MG/1
100 CAPSULE ORAL ONCE
Status: COMPLETED | OUTPATIENT
Start: 2024-08-28 | End: 2024-08-28

## 2024-08-28 RX ORDER — SULFAMETHOXAZOLE AND TRIMETHOPRIM 800; 160 MG/1; MG/1
1 TABLET ORAL 2 TIMES DAILY
Qty: 20 TABLET | Refills: 0 | Status: SHIPPED | OUTPATIENT
Start: 2024-08-28 | End: 2024-09-08

## 2024-08-28 RX ORDER — NITROFURANTOIN 25; 75 MG/1; MG/1
CAPSULE ORAL
Status: COMPLETED
Start: 2024-08-28 | End: 2024-08-28

## 2024-08-28 NOTE — ED PROVIDER NOTES
EMERGENCY MEDICINE EVALUATION NOTE    History of Present Illness     Chief Complaint:   Chief Complaint   Patient presents with    Flank Pain    Urinary Frequency       HPI: Charito Daley is a 44 y.o. female presents with a chief complaint of flank pain and urinary frequency.  Patient reports that she has been seen here a few times recently.  She states that she has been experiencing some pain on the left side of her flank this been going on for a few days.  He reports he also is experiencing urinary frequency.  She states that she has been watching her blood work as she has had some uptrending kidney functions.  Patient reports has been trying to eat and drink appropriately.  Patient denies any history of any kidney stones.  States pain is in the left flank and radiates to the front.  Patient did not take anything upstairs for symptoms other than Tylenol Motrin.  She is currently upstairs in rehab program from fentanyl use.  Patient reports that she was not an IV user.  Patient reports that she has an allergy to cephalosporins as well as penicillins.    Previous History   History reviewed. No pertinent past medical history.  History reviewed. No pertinent surgical history.     No family history on file.  Allergies   Allergen Reactions    Benadryl [Diphenhydramine Hcl] Shortness of breath    Cephalosporins Anaphylaxis, Angioedema, Shortness of breath and Swelling    Penicillins Shortness of breath    Clindamycin Swelling    Phenergan [Promethazine] Headache     Current Outpatient Medications   Medication Instructions    aspirin 81 mg, oral, Every morning    aspirin 81 mg, oral, Every morning    buprenorphine-naloxone (Suboxone) 8-2 mg SL film 0.5 Film, sublingual, Every 6 hours PRN    buprenorphine-naloxone (Suboxone) 8-2 mg SL film 1 Film, sublingual, 2 times daily    buprenorphine-naloxone (Zubsolv) 5.7-1.4 mg SL tablet 1 tablet, sublingual, 2 times daily    cloNIDine (Catapres) 0.1 mg tablet Take 1 tablet by  mouth every 6 hours as needed for anxiety and sweats    dicyclomine (Bentyl) 20 mg tablet Take 1 tablet by mouth every 6 hours as needed for stomach cramps    diphenhydrAMINE (BENADryl) 25 mg capsule Take 1 capsule by mouth every 6 hours as needed    gabapentin (NEURONTIN) 600 mg, oral, 3 times daily    hydrOXYzine HCL (Atarax) 50 mg tablet Take 1 tablet by mouth every 8 hours as needed    levETIRAcetam (KEPPRA) 500 mg, oral, 2 times daily    levothyroxine (SYNTHROID, LEVOXYL) 175 mcg, oral, Every morning    levothyroxine (SYNTHROID, LEVOXYL) 175 mcg, oral, Every morning    levothyroxine (SYNTHROID, LEVOXYL) 175 mcg, oral, Every morning    loperamide (IMODIUM) 4 mg, oral, Every 6 hours PRN    melatonin 3 mg tablet Take 1 tablet by mouth once daily at bedtime as needed    nicotine polacrilex (Nicorette) 2 mg gum Chew and park 1 piece every 4 hours as needed    ondansetron ODT (Zofran-ODT) 4 mg disintegrating tablet Dissolve 1 tablet in mouth three times daily as needed for nausea and vomiting    promethazine (Phenergan) 12.5 mg tablet Take 1-2 tablets by mouth every 6 hours as needed for nausea and vomiting    rOPINIRole (REQUIP) 0.25 mg, oral, 2 times daily    rOPINIRole (REQUIP) 0.25 mg, oral, 2 times daily    traZODone (Desyrel) 50 mg tablet Take 1 tablet by mouth once daily at bedtime as needed for insomnia       Physical Exam     Appearance: Alert, oriented , cooperative,  in no acute distress.      Skin: Intact,  dry skin, no lesions, rash, petechiae or purpura.      Eyes: PERRLA, EOMs intact,  Conjunctiva pink with no redness or exudates.      ENT: Hearing grossly intact. Pharynx clear, uvula midline.      Neck: Supple. Trachea at midline.      Pulmonary: Clear bilaterally. No rales, rhonchi or wheezing. No accessory muscle use or stridor.     Cardiac: Normal rate and rhythm without murmur     Abdomen: Soft, active bowel sounds.  Minimal left flank tenderness with no true CVA tenderness.     Musculoskeletal:  Full range of motion. no pain, edema, or deformity.      Neurological:Cranial nerves II through XII are grossly intact, normal sensation, no weakness, no focal findings identified.     Results     Labs Reviewed   CBC WITH AUTO DIFFERENTIAL - Abnormal       Result Value    WBC 4.8      nRBC 0.0      RBC 3.28 (*)     Hemoglobin 8.3 (*)     Hematocrit 26.8 (*)     MCV 82      MCH 25.3 (*)     MCHC 31.0 (*)     RDW 21.8 (*)     Platelets 240      Neutrophils % 62.1      Immature Granulocytes %, Automated 0.4      Lymphocytes % 27.6      Monocytes % 8.0      Eosinophils % 0.8      Basophils % 1.1      Neutrophils Absolute 2.95      Immature Granulocytes Absolute, Automated 0.02      Lymphocytes Absolute 1.31      Monocytes Absolute 0.38      Eosinophils Absolute 0.04      Basophils Absolute 0.05     COMPREHENSIVE METABOLIC PANEL - Abnormal    Glucose 97      Sodium 137      Potassium 3.9      Chloride 105      Bicarbonate 28      Anion Gap 8 (*)     Urea Nitrogen 23      Creatinine 1.15 (*)     eGFR 61      Calcium 8.4 (*)     Albumin 3.9      Alkaline Phosphatase 47      Total Protein 7.3      AST 57 (*)     Bilirubin, Total 0.4      ALT 44     URINALYSIS WITH REFLEX CULTURE AND MICROSCOPIC - Abnormal    Color, Urine Yellow      Appearance, Urine Clear      Specific Gravity, Urine 1.031      pH, Urine 6.0      Protein, Urine 10 (TRACE)      Glucose, Urine Normal      Blood, Urine NEGATIVE      Ketones, Urine NEGATIVE      Bilirubin, Urine NEGATIVE      Urobilinogen, Urine 2 (1+) (*)     Nitrite, Urine NEGATIVE      Leukocyte Esterase, Urine 25 Shun/µL (*)    MICROSCOPIC ONLY, URINE - Abnormal    WBC, Urine 21-50 (*)     RBC, Urine NONE      Squamous Epithelial Cells, Urine 1-9 (SPARSE)     HCG, URINE, QUALITATIVE - Normal    HCG, Urine NEGATIVE     URINE CULTURE   URINALYSIS WITH REFLEX CULTURE AND MICROSCOPIC    Narrative:     The following orders were created for panel order Urinalysis with Reflex Culture and  "Microscopic.  Procedure                               Abnormality         Status                     ---------                               -----------         ------                     Urinalysis with Reflex C...[[id number]]  Abnormal            Final result               Extra Urine Gray Tube[[id number]]                            In process                   Please view results for these tests on the individual orders.   EXTRA URINE GRAY TUBE   MORPHOLOGY    RBC Morphology See Below      Ovalocytes Few      Acanthocytes Few       CT abdomen pelvis wo IV contrast    (Results Pending)         ED Course & Medical Decision Making   Medications - No data to display  Heart Rate:  [76]   Temperature:  [36.7 °C (98.1 °F)]   Respirations:  [16]   BP: (130)/(79)   Height:  [177.8 cm (5' 10\")]   Weight:  [99.8 kg (220 lb)]   Pulse Ox:  [98 %]    ED Course as of 08/28/24 0128   Wed Aug 28, 2024   0126 Patient signed out with any physician's or change pending reevaluation after CT imaging. [CJ]      ED Course User Index  [CJ] Juan A Mosley PA-C         Diagnoses as of 08/28/24 0128   Flank pain       Procedures   Procedures    Diagnosis     1. Flank pain        Disposition   Signed out pending reevaluation    ED Prescriptions    None         Disclaimer: This note was dictated by speech recognition. Minor errors in transcription may be present. Please call if questions.       Juan A Mosley PA-C  08/28/24 0128       Juan A Mosley PA-C  08/28/24 0133    "

## 2024-08-28 NOTE — ED TRIAGE NOTES
Pt arrives to the ER from [facility], sent down for her flank pain that is an acute on chronic issue. PT was seen in [place] for same issue and left before her CT scan, felt her issues resolved. Pt feels it has come back now, pt has had more feelings of frequency, but does not urinate much. Also having increased constipation, since being sober. PT states 'pain is bad she has to walk with a cane'. Pt states no other concerns.

## 2024-08-29 ENCOUNTER — PHARMACY VISIT (OUTPATIENT)
Dept: PHARMACY | Facility: CLINIC | Age: 44
End: 2024-08-29
Payer: MEDICARE

## 2024-08-29 LAB — BACTERIA UR CULT: NO GROWTH

## 2024-09-01 ENCOUNTER — PHARMACY VISIT (OUTPATIENT)
Dept: PHARMACY | Facility: CLINIC | Age: 44
End: 2024-09-01
Payer: MEDICARE

## 2024-09-01 PROCEDURE — RXMED WILLOW AMBULATORY MEDICATION CHARGE

## 2024-09-01 RX ORDER — FUROSEMIDE 20 MG/1
20 TABLET ORAL DAILY
Qty: 5 TABLET | Refills: 0 | OUTPATIENT
Start: 2024-09-01 | End: 2024-09-06 | Stop reason: SDUPTHER

## 2024-09-03 ENCOUNTER — PHARMACY VISIT (OUTPATIENT)
Dept: PHARMACY | Facility: CLINIC | Age: 44
End: 2024-09-03
Payer: MEDICARE

## 2024-09-03 PROCEDURE — RXMED WILLOW AMBULATORY MEDICATION CHARGE

## 2024-09-03 RX ORDER — ROPINIROLE 0.25 MG/1
0.25 TABLET, FILM COATED ORAL 2 TIMES DAILY
Qty: 14 TABLET | Refills: 0 | OUTPATIENT
Start: 2024-09-03

## 2024-09-05 ENCOUNTER — LAB REQUISITION (OUTPATIENT)
Dept: LAB | Facility: HOSPITAL | Age: 44
End: 2024-09-05
Payer: COMMERCIAL

## 2024-09-05 DIAGNOSIS — Z20.828 CONTACT WITH AND (SUSPECTED) EXPOSURE TO OTHER VIRAL COMMUNICABLE DISEASES: ICD-10-CM

## 2024-09-05 DIAGNOSIS — Z00.00 ENCOUNTER FOR GENERAL ADULT MEDICAL EXAMINATION WITHOUT ABNORMAL FINDINGS: ICD-10-CM

## 2024-09-05 LAB
ALBUMIN SERPL BCP-MCNC: 3.6 G/DL (ref 3.4–5)
ALP SERPL-CCNC: 43 U/L (ref 33–110)
ALT SERPL W P-5'-P-CCNC: 42 U/L (ref 7–45)
ANION GAP SERPL CALC-SCNC: 13 MMOL/L (ref 10–20)
AST SERPL W P-5'-P-CCNC: 48 U/L (ref 9–39)
BASOPHILS # BLD AUTO: 0.05 X10*3/UL (ref 0–0.1)
BASOPHILS NFR BLD AUTO: 1.1 %
BILIRUB SERPL-MCNC: 0.4 MG/DL (ref 0–1.2)
BUN SERPL-MCNC: 23 MG/DL (ref 6–23)
CALCIUM SERPL-MCNC: 8.1 MG/DL (ref 8.6–10.3)
CHLORIDE SERPL-SCNC: 100 MMOL/L (ref 98–107)
CO2 SERPL-SCNC: 29 MMOL/L (ref 21–32)
CREAT SERPL-MCNC: 1.15 MG/DL (ref 0.5–1.05)
EGFRCR SERPLBLD CKD-EPI 2021: 61 ML/MIN/1.73M*2
EOSINOPHIL # BLD AUTO: 0.08 X10*3/UL (ref 0–0.7)
EOSINOPHIL NFR BLD AUTO: 1.8 %
ERYTHROCYTE [DISTWIDTH] IN BLOOD BY AUTOMATED COUNT: 20 % (ref 11.5–14.5)
GLUCOSE SERPL-MCNC: 79 MG/DL (ref 74–99)
HCT VFR BLD AUTO: 26.8 % (ref 36–46)
HGB BLD-MCNC: 8.2 G/DL (ref 12–16)
HOLD SPECIMEN: NORMAL
HOLD SPECIMEN: NORMAL
IMM GRANULOCYTES # BLD AUTO: 0.02 X10*3/UL (ref 0–0.7)
IMM GRANULOCYTES NFR BLD AUTO: 0.5 % (ref 0–0.9)
LYMPHOCYTES # BLD AUTO: 1.37 X10*3/UL (ref 1.2–4.8)
LYMPHOCYTES NFR BLD AUTO: 31.1 %
MCH RBC QN AUTO: 24.6 PG (ref 26–34)
MCHC RBC AUTO-ENTMCNC: 30.6 G/DL (ref 32–36)
MCV RBC AUTO: 80 FL (ref 80–100)
MONOCYTES # BLD AUTO: 0.4 X10*3/UL (ref 0.1–1)
MONOCYTES NFR BLD AUTO: 9.1 %
NEUTROPHILS # BLD AUTO: 2.48 X10*3/UL (ref 1.2–7.7)
NEUTROPHILS NFR BLD AUTO: 56.4 %
NRBC BLD-RTO: 0 /100 WBCS (ref 0–0)
PLATELET # BLD AUTO: 308 X10*3/UL (ref 150–450)
POTASSIUM SERPL-SCNC: 4.4 MMOL/L (ref 3.5–5.3)
PROT SERPL-MCNC: 6.6 G/DL (ref 6.4–8.2)
RBC # BLD AUTO: 3.34 X10*6/UL (ref 4–5.2)
SODIUM SERPL-SCNC: 138 MMOL/L (ref 136–145)
T3 SERPL-MCNC: 100 NG/DL (ref 60–200)
T4 SERPL-MCNC: 8.5 UG/DL (ref 4.5–11.1)
TSH SERPL-ACNC: 83 MIU/L (ref 0.44–3.98)
WBC # BLD AUTO: 4.4 X10*3/UL (ref 4.4–11.3)

## 2024-09-05 PROCEDURE — 85025 COMPLETE CBC W/AUTO DIFF WBC: CPT | Mod: OUT | Performed by: NURSE PRACTITIONER

## 2024-09-05 PROCEDURE — 84443 ASSAY THYROID STIM HORMONE: CPT | Mod: OUT | Performed by: NURSE PRACTITIONER

## 2024-09-05 PROCEDURE — 36415 COLL VENOUS BLD VENIPUNCTURE: CPT | Mod: OUT | Performed by: NURSE PRACTITIONER

## 2024-09-05 PROCEDURE — 84480 ASSAY TRIIODOTHYRONINE (T3): CPT | Mod: OUT,PORLAB | Performed by: NURSE PRACTITIONER

## 2024-09-05 PROCEDURE — 84075 ASSAY ALKALINE PHOSPHATASE: CPT | Mod: OUT | Performed by: NURSE PRACTITIONER

## 2024-09-05 PROCEDURE — 84436 ASSAY OF TOTAL THYROXINE: CPT | Mod: OUT,PORLAB | Performed by: NURSE PRACTITIONER

## 2024-09-06 ENCOUNTER — PHARMACY VISIT (OUTPATIENT)
Dept: PHARMACY | Facility: CLINIC | Age: 44
End: 2024-09-06
Payer: MEDICARE

## 2024-09-06 PROCEDURE — RXMED WILLOW AMBULATORY MEDICATION CHARGE

## 2024-09-06 RX ORDER — FUROSEMIDE 20 MG/1
20 TABLET ORAL DAILY
Qty: 7 TABLET | Refills: 0 | OUTPATIENT
Start: 2024-09-06

## 2024-09-07 ENCOUNTER — PHARMACY VISIT (OUTPATIENT)
Dept: PHARMACY | Facility: CLINIC | Age: 44
End: 2024-09-07
Payer: MEDICARE

## 2024-09-07 PROCEDURE — RXMED WILLOW AMBULATORY MEDICATION CHARGE

## 2024-09-07 RX ORDER — LEVOTHYROXINE SODIUM 200 UG/1
200 TABLET ORAL EVERY MORNING
Qty: 7 TABLET | Refills: 0 | OUTPATIENT
Start: 2024-09-07

## 2024-09-08 ENCOUNTER — PHARMACY VISIT (OUTPATIENT)
Dept: PHARMACY | Facility: CLINIC | Age: 44
End: 2024-09-08
Payer: MEDICARE

## 2024-09-08 PROCEDURE — RXMED WILLOW AMBULATORY MEDICATION CHARGE

## 2024-09-08 RX ORDER — GABAPENTIN 600 MG/1
600 TABLET ORAL 3 TIMES DAILY
Qty: 14 TABLET | Refills: 0 | OUTPATIENT
Start: 2024-09-08

## 2024-09-08 RX ORDER — ASPIRIN 81 MG/1
81 TABLET ORAL EVERY MORNING
Qty: 14 TABLET | Refills: 0 | OUTPATIENT
Start: 2024-09-08

## 2024-09-08 RX ORDER — ROPINIROLE 0.25 MG/1
0.25 TABLET, FILM COATED ORAL 2 TIMES DAILY
Qty: 14 TABLET | Refills: 0 | OUTPATIENT
Start: 2024-09-08

## 2024-09-09 ENCOUNTER — PHARMACY VISIT (OUTPATIENT)
Dept: PHARMACY | Facility: CLINIC | Age: 44
End: 2024-09-09
Payer: MEDICAID

## 2024-09-09 PROCEDURE — RXMED WILLOW AMBULATORY MEDICATION CHARGE

## 2024-09-09 RX ORDER — LEVETIRACETAM 500 MG/1
500 TABLET ORAL 2 TIMES DAILY
Qty: 30 TABLET | Refills: 0 | OUTPATIENT
Start: 2024-09-09

## 2024-09-09 RX ORDER — TALC
3 POWDER (GRAM) TOPICAL NIGHTLY PRN
Qty: 10 TABLET | Refills: 0 | OUTPATIENT
Start: 2024-09-09

## 2024-09-10 ENCOUNTER — PHARMACY VISIT (OUTPATIENT)
Dept: PHARMACY | Facility: CLINIC | Age: 44
End: 2024-09-10
Payer: MEDICARE

## 2024-09-10 PROCEDURE — RXMED WILLOW AMBULATORY MEDICATION CHARGE

## 2024-09-10 RX ORDER — ROPINIROLE 0.25 MG/1
0.25 TABLET, FILM COATED ORAL 2 TIMES DAILY
Qty: 14 TABLET | Refills: 0 | OUTPATIENT
Start: 2024-09-10

## 2024-09-11 ENCOUNTER — APPOINTMENT (OUTPATIENT)
Dept: RADIOLOGY | Facility: HOSPITAL | Age: 44
End: 2024-09-11
Payer: COMMERCIAL

## 2024-09-11 ENCOUNTER — APPOINTMENT (OUTPATIENT)
Dept: CARDIOLOGY | Facility: HOSPITAL | Age: 44
End: 2024-09-11
Payer: COMMERCIAL

## 2024-09-11 ENCOUNTER — LAB REQUISITION (OUTPATIENT)
Dept: LAB | Facility: HOSPITAL | Age: 44
End: 2024-09-11
Payer: COMMERCIAL

## 2024-09-11 ENCOUNTER — HOSPITAL ENCOUNTER (EMERGENCY)
Facility: HOSPITAL | Age: 44
Discharge: HOME | End: 2024-09-12
Attending: STUDENT IN AN ORGANIZED HEALTH CARE EDUCATION/TRAINING PROGRAM
Payer: COMMERCIAL

## 2024-09-11 DIAGNOSIS — Z00.00 ENCOUNTER FOR GENERAL ADULT MEDICAL EXAMINATION WITHOUT ABNORMAL FINDINGS: ICD-10-CM

## 2024-09-11 DIAGNOSIS — R60.0 EDEMA LEG: Primary | ICD-10-CM

## 2024-09-11 DIAGNOSIS — R60.0 BILATERAL LOWER EXTREMITY EDEMA: ICD-10-CM

## 2024-09-11 DIAGNOSIS — Z20.828 CONTACT WITH AND (SUSPECTED) EXPOSURE TO OTHER VIRAL COMMUNICABLE DISEASES: ICD-10-CM

## 2024-09-11 LAB
ALBUMIN SERPL BCP-MCNC: 3.6 G/DL (ref 3.4–5)
ALBUMIN SERPL BCP-MCNC: 3.8 G/DL (ref 3.4–5)
ALP SERPL-CCNC: 42 U/L (ref 33–110)
ALP SERPL-CCNC: 46 U/L (ref 33–110)
ALT SERPL W P-5'-P-CCNC: 39 U/L (ref 7–45)
ALT SERPL W P-5'-P-CCNC: 42 U/L (ref 7–45)
ANION GAP SERPL CALC-SCNC: 9 MMOL/L (ref 10–20)
ANION GAP SERPL CALC-SCNC: 9 MMOL/L (ref 10–20)
AST SERPL W P-5'-P-CCNC: 47 U/L (ref 9–39)
AST SERPL W P-5'-P-CCNC: 51 U/L (ref 9–39)
BASOPHILS # BLD AUTO: 0.06 X10*3/UL (ref 0–0.1)
BASOPHILS # BLD AUTO: 0.09 X10*3/UL (ref 0–0.1)
BASOPHILS NFR BLD AUTO: 1.2 %
BASOPHILS NFR BLD AUTO: 1.6 %
BILIRUB SERPL-MCNC: 0.3 MG/DL (ref 0–1.2)
BILIRUB SERPL-MCNC: 0.4 MG/DL (ref 0–1.2)
BNP SERPL-MCNC: 288 PG/ML (ref 0–99)
BUN SERPL-MCNC: 17 MG/DL (ref 6–23)
BUN SERPL-MCNC: 17 MG/DL (ref 6–23)
CALCIUM SERPL-MCNC: 8.2 MG/DL (ref 8.6–10.3)
CALCIUM SERPL-MCNC: 8.3 MG/DL (ref 8.6–10.3)
CARDIAC TROPONIN I PNL SERPL HS: 3 NG/L (ref 0–13)
CARDIAC TROPONIN I PNL SERPL HS: 4 NG/L (ref 0–13)
CHLORIDE SERPL-SCNC: 102 MMOL/L (ref 98–107)
CHLORIDE SERPL-SCNC: 103 MMOL/L (ref 98–107)
CO2 SERPL-SCNC: 29 MMOL/L (ref 21–32)
CO2 SERPL-SCNC: 30 MMOL/L (ref 21–32)
CREAT SERPL-MCNC: 1.02 MG/DL (ref 0.5–1.05)
CREAT SERPL-MCNC: 1.06 MG/DL (ref 0.5–1.05)
D DIMER PPP FEU-MCNC: 1286 NG/ML FEU
EGFRCR SERPLBLD CKD-EPI 2021: 67 ML/MIN/1.73M*2
EGFRCR SERPLBLD CKD-EPI 2021: 70 ML/MIN/1.73M*2
EOSINOPHIL # BLD AUTO: 0.14 X10*3/UL (ref 0–0.7)
EOSINOPHIL # BLD AUTO: 0.15 X10*3/UL (ref 0–0.7)
EOSINOPHIL NFR BLD AUTO: 2.6 %
EOSINOPHIL NFR BLD AUTO: 2.8 %
ERYTHROCYTE [DISTWIDTH] IN BLOOD BY AUTOMATED COUNT: 19 % (ref 11.5–14.5)
ERYTHROCYTE [DISTWIDTH] IN BLOOD BY AUTOMATED COUNT: 19.2 % (ref 11.5–14.5)
GLUCOSE SERPL-MCNC: 77 MG/DL (ref 74–99)
GLUCOSE SERPL-MCNC: 79 MG/DL (ref 74–99)
HCT VFR BLD AUTO: 23.2 % (ref 36–46)
HCT VFR BLD AUTO: 23.9 % (ref 36–46)
HGB BLD-MCNC: 7 G/DL (ref 12–16)
HGB BLD-MCNC: 7.3 G/DL (ref 12–16)
IMM GRANULOCYTES # BLD AUTO: 0.01 X10*3/UL (ref 0–0.7)
IMM GRANULOCYTES # BLD AUTO: 0.02 X10*3/UL (ref 0–0.7)
IMM GRANULOCYTES NFR BLD AUTO: 0.2 % (ref 0–0.9)
IMM GRANULOCYTES NFR BLD AUTO: 0.4 % (ref 0–0.9)
LYMPHOCYTES # BLD AUTO: 1.25 X10*3/UL (ref 1.2–4.8)
LYMPHOCYTES # BLD AUTO: 1.41 X10*3/UL (ref 1.2–4.8)
LYMPHOCYTES NFR BLD AUTO: 24.8 %
LYMPHOCYTES NFR BLD AUTO: 24.9 %
MCH RBC QN AUTO: 24.1 PG (ref 26–34)
MCH RBC QN AUTO: 24.6 PG (ref 26–34)
MCHC RBC AUTO-ENTMCNC: 30.2 G/DL (ref 32–36)
MCHC RBC AUTO-ENTMCNC: 30.5 G/DL (ref 32–36)
MCV RBC AUTO: 80 FL (ref 80–100)
MCV RBC AUTO: 81 FL (ref 80–100)
MONOCYTES # BLD AUTO: 0.45 X10*3/UL (ref 0.1–1)
MONOCYTES # BLD AUTO: 0.56 X10*3/UL (ref 0.1–1)
MONOCYTES NFR BLD AUTO: 9 %
MONOCYTES NFR BLD AUTO: 9.8 %
NEUTROPHILS # BLD AUTO: 3.11 X10*3/UL (ref 1.2–7.7)
NEUTROPHILS # BLD AUTO: 3.46 X10*3/UL (ref 1.2–7.7)
NEUTROPHILS NFR BLD AUTO: 60.8 %
NEUTROPHILS NFR BLD AUTO: 61.9 %
NRBC BLD-RTO: 0 /100 WBCS (ref 0–0)
NRBC BLD-RTO: 0 /100 WBCS (ref 0–0)
PLATELET # BLD AUTO: 230 X10*3/UL (ref 150–450)
PLATELET # BLD AUTO: 242 X10*3/UL (ref 150–450)
POTASSIUM SERPL-SCNC: 4 MMOL/L (ref 3.5–5.3)
POTASSIUM SERPL-SCNC: 4 MMOL/L (ref 3.5–5.3)
PROT SERPL-MCNC: 7 G/DL (ref 6.4–8.2)
PROT SERPL-MCNC: 7.3 G/DL (ref 6.4–8.2)
RBC # BLD AUTO: 2.9 X10*6/UL (ref 4–5.2)
RBC # BLD AUTO: 2.97 X10*6/UL (ref 4–5.2)
SODIUM SERPL-SCNC: 136 MMOL/L (ref 136–145)
SODIUM SERPL-SCNC: 138 MMOL/L (ref 136–145)
T3 SERPL-MCNC: 105 NG/DL (ref 60–200)
T4 SERPL-MCNC: 9.8 UG/DL (ref 4.5–11.1)
TSH SERPL-ACNC: 63 MIU/L (ref 0.44–3.98)
WBC # BLD AUTO: 5 X10*3/UL (ref 4.4–11.3)
WBC # BLD AUTO: 5.7 X10*3/UL (ref 4.4–11.3)

## 2024-09-11 PROCEDURE — 84436 ASSAY OF TOTAL THYROXINE: CPT | Mod: OUT,PORLAB | Performed by: NURSE PRACTITIONER

## 2024-09-11 PROCEDURE — 85379 FIBRIN DEGRADATION QUANT: CPT | Performed by: STUDENT IN AN ORGANIZED HEALTH CARE EDUCATION/TRAINING PROGRAM

## 2024-09-11 PROCEDURE — 84484 ASSAY OF TROPONIN QUANT: CPT | Performed by: STUDENT IN AN ORGANIZED HEALTH CARE EDUCATION/TRAINING PROGRAM

## 2024-09-11 PROCEDURE — 85025 COMPLETE CBC W/AUTO DIFF WBC: CPT | Mod: OUT | Performed by: NURSE PRACTITIONER

## 2024-09-11 PROCEDURE — 93970 EXTREMITY STUDY: CPT

## 2024-09-11 PROCEDURE — 85025 COMPLETE CBC W/AUTO DIFF WBC: CPT | Performed by: STUDENT IN AN ORGANIZED HEALTH CARE EDUCATION/TRAINING PROGRAM

## 2024-09-11 PROCEDURE — 93005 ELECTROCARDIOGRAM TRACING: CPT

## 2024-09-11 PROCEDURE — 84443 ASSAY THYROID STIM HORMONE: CPT | Mod: OUT | Performed by: NURSE PRACTITIONER

## 2024-09-11 PROCEDURE — 36415 COLL VENOUS BLD VENIPUNCTURE: CPT | Mod: OUT | Performed by: NURSE PRACTITIONER

## 2024-09-11 PROCEDURE — 71045 X-RAY EXAM CHEST 1 VIEW: CPT

## 2024-09-11 PROCEDURE — 71045 X-RAY EXAM CHEST 1 VIEW: CPT | Performed by: RADIOLOGY

## 2024-09-11 PROCEDURE — 83880 ASSAY OF NATRIURETIC PEPTIDE: CPT | Performed by: STUDENT IN AN ORGANIZED HEALTH CARE EDUCATION/TRAINING PROGRAM

## 2024-09-11 PROCEDURE — 93970 EXTREMITY STUDY: CPT | Performed by: RADIOLOGY

## 2024-09-11 PROCEDURE — 84075 ASSAY ALKALINE PHOSPHATASE: CPT | Performed by: STUDENT IN AN ORGANIZED HEALTH CARE EDUCATION/TRAINING PROGRAM

## 2024-09-11 PROCEDURE — 36415 COLL VENOUS BLD VENIPUNCTURE: CPT | Performed by: STUDENT IN AN ORGANIZED HEALTH CARE EDUCATION/TRAINING PROGRAM

## 2024-09-11 PROCEDURE — 99285 EMERGENCY DEPT VISIT HI MDM: CPT | Mod: 25

## 2024-09-11 PROCEDURE — 84480 ASSAY TRIIODOTHYRONINE (T3): CPT | Mod: OUT,PORLAB | Performed by: NURSE PRACTITIONER

## 2024-09-11 PROCEDURE — 80053 COMPREHEN METABOLIC PANEL: CPT | Mod: OUT | Performed by: NURSE PRACTITIONER

## 2024-09-11 RX ORDER — FUROSEMIDE 10 MG/ML
40 INJECTION INTRAMUSCULAR; INTRAVENOUS ONCE
Status: COMPLETED | OUTPATIENT
Start: 2024-09-11 | End: 2024-09-12

## 2024-09-11 ASSESSMENT — PAIN SCALES - GENERAL
PAINLEVEL_OUTOF10: 0 - NO PAIN
PAINLEVEL_OUTOF10: 8

## 2024-09-11 ASSESSMENT — PAIN DESCRIPTION - DIRECTION: RADIATING_TOWARDS: ALL OVER

## 2024-09-11 ASSESSMENT — COLUMBIA-SUICIDE SEVERITY RATING SCALE - C-SSRS
2. HAVE YOU ACTUALLY HAD ANY THOUGHTS OF KILLING YOURSELF?: NO
6. HAVE YOU EVER DONE ANYTHING, STARTED TO DO ANYTHING, OR PREPARED TO DO ANYTHING TO END YOUR LIFE?: NO
1. IN THE PAST MONTH, HAVE YOU WISHED YOU WERE DEAD OR WISHED YOU COULD GO TO SLEEP AND NOT WAKE UP?: NO

## 2024-09-11 ASSESSMENT — PAIN - FUNCTIONAL ASSESSMENT: PAIN_FUNCTIONAL_ASSESSMENT: 0-10

## 2024-09-12 ENCOUNTER — PHARMACY VISIT (OUTPATIENT)
Dept: PHARMACY | Facility: CLINIC | Age: 44
End: 2024-09-12
Payer: MEDICARE

## 2024-09-12 VITALS
DIASTOLIC BLOOD PRESSURE: 79 MMHG | SYSTOLIC BLOOD PRESSURE: 114 MMHG | OXYGEN SATURATION: 98 % | HEART RATE: 64 BPM | RESPIRATION RATE: 18 BRPM | TEMPERATURE: 97.3 F | BODY MASS INDEX: 33.64 KG/M2 | WEIGHT: 235 LBS | HEIGHT: 70 IN

## 2024-09-12 PROCEDURE — RXMED WILLOW AMBULATORY MEDICATION CHARGE

## 2024-09-12 PROCEDURE — 96374 THER/PROPH/DIAG INJ IV PUSH: CPT

## 2024-09-12 PROCEDURE — 2500000004 HC RX 250 GENERAL PHARMACY W/ HCPCS (ALT 636 FOR OP/ED): Performed by: STUDENT IN AN ORGANIZED HEALTH CARE EDUCATION/TRAINING PROGRAM

## 2024-09-12 RX ORDER — FUROSEMIDE 20 MG/1
20 TABLET ORAL DAILY
Qty: 7 TABLET | Refills: 0 | OUTPATIENT
Start: 2024-09-12 | End: 2024-09-13 | Stop reason: SDUPTHER

## 2024-09-12 NOTE — ED PROVIDER NOTES
HPI   Chief Complaint   Patient presents with    Leg Swelling     X 2 weeks with fatigue and no appetite       45 yo F presents from [facility] with reported worsening lower extremity edema for the past 2 weeks. She states she was recently started on Lasix approximately four days ago during her detox stay, but does not feel her symptoms are improving. She is uncertain whether she has an official diagnosis of heart failure. She denies any history of DVT or PE, lightheadedness, syncope, chest pain, shortness of breath, abdominal pain, N/V/D, difficulty ambulating.              Patient History   No past medical history on file.  No past surgical history on file.  No family history on file.  Social History     Tobacco Use    Smoking status: Not on file    Smokeless tobacco: Not on file   Substance Use Topics    Alcohol use: Not on file    Drug use: Not on file       Physical Exam   ED Triage Vitals [09/11/24 1736]   Temperature Heart Rate Respirations BP   36.3 °C (97.3 °F) 70 16 121/75      Pulse Ox Temp src Heart Rate Source Patient Position   99 % -- -- --      BP Location FiO2 (%)     -- --       Physical Exam  Vitals reviewed.   Constitutional:       Appearance: She is not toxic-appearing.   Eyes:      Extraocular Movements: Extraocular movements intact.      Pupils: Pupils are equal, round, and reactive to light.   Cardiovascular:      Rate and Rhythm: Normal rate and regular rhythm.   Pulmonary:      Effort: Pulmonary effort is normal.      Breath sounds: Normal breath sounds.   Abdominal:      Palpations: Abdomen is soft.      Tenderness: There is no abdominal tenderness.   Musculoskeletal:         General: Normal range of motion.      Cervical back: Normal range of motion.      Right lower leg: Edema (2+) present.      Left lower leg: Edema (2+) present.   Skin:     Capillary Refill: Capillary refill takes less than 2 seconds.   Neurological:      General: No focal deficit present.      Mental Status: She  is alert and oriented to person, place, and time.           ED Course & MDM   Diagnoses as of 09/12/24 0241   Bilateral lower extremity edema                 No data recorded     [place] Coma Scale Score: 15 (09/11/24 1736 : Sharon Cerna, GRACE)                           Medical Decision Making  Patient with 2+ pitting edema to bilateral lower extremities for the past ~ 2 weeks. Exam showing bilateral pitting edema, no erythema, negative Chandrika's sign, calf circumference equal bilaterally. No associated SOB or LEON per patient. No history of DVT or PE. Started on lasix several days ago. Has not used compression stockings or elevated feet for her symptoms. Low suspicion for DVT at this time, however will obtain dimer as patient has no formal diagnosis of CHF and cannot rule out underlying DVT without other known etiology of pitting edema. Lab workup showing mildly elevated BNP with no pleural effusion or pulmonary edema on imaging, US showing no acute evidence of DVT. Low suspicion for PE given lack of symptoms and stable vitals at this time, negative YEARS criteria. Patient given a dose of IV lasix, and given prescription for compression stockings and recommendation to elevate both legs above the level of her heart while sleeping or resting. Patient encouraged to follow up outpatient with PCP and cardiology, given order for echocardiogram stress test for routine outpatient follow up. Discussed return precautions. Patient voiced understanding and agreement with plan. Discharged in stable condition back to [facility].        Procedure  Procedures     Nicole Gonzales MD  09/12/24 9928    ADDENDUM ADDED FOR EKG INTERPRETATION  EKG, interpreted by me: sinus rhythm, rate 63 bpm. Normal axis. Low voltage QRS. No acute ST elevation or depression. No acute T wave abnormalities. .         Nicole Gonzales MD  10/31/24 3405

## 2024-09-12 NOTE — DISCHARGE INSTRUCTIONS
You were seen in the emergency department for lower extremity swelling.  Your workup showed no evidence of blood clot in your legs at this time.  You were given a dose of Lasix through your IV.  Please continue to take your prescribed Lasix and all medications as prescribed.    Please call your cardiologist and/your primary care provider for follow-up in the next few days.  You may need an outpatient echocardiogram (ultrasound of your heart) to rule out any worsening heart failure.    Please elevate your legs above the level of your heart, and use compression stockings throughout the day to help reduce swelling.    Please return to the emergency department with any worsening symptoms including difficulty breathing or shortness of breath, lightheadedness, dizziness, loss of consciousness, chest pain, or other concerns.

## 2024-09-13 ENCOUNTER — PHARMACY VISIT (OUTPATIENT)
Dept: PHARMACY | Facility: CLINIC | Age: 44
End: 2024-09-13
Payer: MEDICARE

## 2024-09-13 ENCOUNTER — HOSPITAL ENCOUNTER (EMERGENCY)
Facility: HOSPITAL | Age: 44
Discharge: HOME | End: 2024-09-13
Attending: EMERGENCY MEDICINE
Payer: COMMERCIAL

## 2024-09-13 VITALS
DIASTOLIC BLOOD PRESSURE: 67 MMHG | HEART RATE: 59 BPM | WEIGHT: 235 LBS | RESPIRATION RATE: 18 BRPM | OXYGEN SATURATION: 97 % | SYSTOLIC BLOOD PRESSURE: 104 MMHG | HEIGHT: 70 IN | TEMPERATURE: 98.1 F | BODY MASS INDEX: 33.64 KG/M2

## 2024-09-13 DIAGNOSIS — D64.9 ANEMIA REQUIRING TRANSFUSIONS: Primary | ICD-10-CM

## 2024-09-13 LAB
ABO GROUP (TYPE) IN BLOOD: NORMAL
ABO GROUP (TYPE) IN BLOOD: NORMAL
ANION GAP SERPL CALC-SCNC: 7 MMOL/L (ref 10–20)
ANTIBODY SCREEN: NORMAL
BASOPHILS # BLD AUTO: 0.05 X10*3/UL (ref 0–0.1)
BASOPHILS NFR BLD AUTO: 1.2 %
BLOOD EXPIRATION DATE: NORMAL
BUN SERPL-MCNC: 18 MG/DL (ref 6–23)
CALCIUM SERPL-MCNC: 7.8 MG/DL (ref 8.6–10.3)
CHLORIDE SERPL-SCNC: 101 MMOL/L (ref 98–107)
CO2 SERPL-SCNC: 31 MMOL/L (ref 21–32)
CREAT SERPL-MCNC: 1.07 MG/DL (ref 0.5–1.05)
DISPENSE STATUS: NORMAL
EGFRCR SERPLBLD CKD-EPI 2021: 66 ML/MIN/1.73M*2
EOSINOPHIL # BLD AUTO: 0.1 X10*3/UL (ref 0–0.7)
EOSINOPHIL NFR BLD AUTO: 2.4 %
ERYTHROCYTE [DISTWIDTH] IN BLOOD BY AUTOMATED COUNT: 19 % (ref 11.5–14.5)
GLUCOSE SERPL-MCNC: 79 MG/DL (ref 74–99)
HCT VFR BLD AUTO: 22.2 % (ref 36–46)
HGB BLD-MCNC: 6.8 G/DL (ref 12–16)
HYPOCHROMIA BLD QL SMEAR: NORMAL
IMM GRANULOCYTES # BLD AUTO: 0.01 X10*3/UL (ref 0–0.7)
IMM GRANULOCYTES NFR BLD AUTO: 0.2 % (ref 0–0.9)
LYMPHOCYTES # BLD AUTO: 1.38 X10*3/UL (ref 1.2–4.8)
LYMPHOCYTES NFR BLD AUTO: 33.3 %
MCH RBC QN AUTO: 24.4 PG (ref 26–34)
MCHC RBC AUTO-ENTMCNC: 30.6 G/DL (ref 32–36)
MCV RBC AUTO: 80 FL (ref 80–100)
MONOCYTES # BLD AUTO: 0.35 X10*3/UL (ref 0.1–1)
MONOCYTES NFR BLD AUTO: 8.4 %
NEUTROPHILS # BLD AUTO: 2.26 X10*3/UL (ref 1.2–7.7)
NEUTROPHILS NFR BLD AUTO: 54.5 %
NRBC BLD-RTO: 0 /100 WBCS (ref 0–0)
PLATELET # BLD AUTO: 232 X10*3/UL (ref 150–450)
POLYCHROMASIA BLD QL SMEAR: NORMAL
POTASSIUM SERPL-SCNC: 4 MMOL/L (ref 3.5–5.3)
PRODUCT BLOOD TYPE: 6200
PRODUCT CODE: NORMAL
RBC # BLD AUTO: 2.79 X10*6/UL (ref 4–5.2)
RBC MORPH BLD: NORMAL
RH FACTOR (ANTIGEN D): NORMAL
RH FACTOR (ANTIGEN D): NORMAL
SODIUM SERPL-SCNC: 135 MMOL/L (ref 136–145)
T4 FREE SERPL-MCNC: 0.8 NG/DL (ref 0.61–1.12)
TSH SERPL-ACNC: 62 MIU/L (ref 0.44–3.98)
UNIT ABO: NORMAL
UNIT NUMBER: NORMAL
UNIT RH: NORMAL
UNIT VOLUME: 350
WBC # BLD AUTO: 4.2 X10*3/UL (ref 4.4–11.3)
XM INTEP: NORMAL

## 2024-09-13 PROCEDURE — 85025 COMPLETE CBC W/AUTO DIFF WBC: CPT | Performed by: EMERGENCY MEDICINE

## 2024-09-13 PROCEDURE — 80048 BASIC METABOLIC PNL TOTAL CA: CPT | Performed by: EMERGENCY MEDICINE

## 2024-09-13 PROCEDURE — 86920 COMPATIBILITY TEST SPIN: CPT

## 2024-09-13 PROCEDURE — 99285 EMERGENCY DEPT VISIT HI MDM: CPT | Mod: 25

## 2024-09-13 PROCEDURE — RXMED WILLOW AMBULATORY MEDICATION CHARGE

## 2024-09-13 PROCEDURE — 84439 ASSAY OF FREE THYROXINE: CPT | Performed by: EMERGENCY MEDICINE

## 2024-09-13 PROCEDURE — 84443 ASSAY THYROID STIM HORMONE: CPT | Performed by: EMERGENCY MEDICINE

## 2024-09-13 PROCEDURE — P9016 RBC LEUKOCYTES REDUCED: HCPCS

## 2024-09-13 PROCEDURE — 86850 RBC ANTIBODY SCREEN: CPT | Performed by: EMERGENCY MEDICINE

## 2024-09-13 PROCEDURE — 36415 COLL VENOUS BLD VENIPUNCTURE: CPT | Performed by: EMERGENCY MEDICINE

## 2024-09-13 PROCEDURE — 99291 CRITICAL CARE FIRST HOUR: CPT | Performed by: EMERGENCY MEDICINE

## 2024-09-13 PROCEDURE — 36430 TRANSFUSION BLD/BLD COMPNT: CPT

## 2024-09-13 RX ORDER — GABAPENTIN 600 MG/1
600 TABLET ORAL 3 TIMES DAILY
Qty: 28 TABLET | Refills: 0 | OUTPATIENT
Start: 2024-09-13

## 2024-09-13 RX ORDER — FUROSEMIDE 20 MG/1
20 TABLET ORAL DAILY
Qty: 14 TABLET | Refills: 0 | OUTPATIENT
Start: 2024-09-13

## 2024-09-13 ASSESSMENT — LIFESTYLE VARIABLES
EVER HAD A DRINK FIRST THING IN THE MORNING TO STEADY YOUR NERVES TO GET RID OF A HANGOVER: NO
TOTAL SCORE: 0
HAVE YOU EVER FELT YOU SHOULD CUT DOWN ON YOUR DRINKING: NO
HAVE PEOPLE ANNOYED YOU BY CRITICIZING YOUR DRINKING: NO
EVER FELT BAD OR GUILTY ABOUT YOUR DRINKING: NO

## 2024-09-13 ASSESSMENT — COLUMBIA-SUICIDE SEVERITY RATING SCALE - C-SSRS
2. HAVE YOU ACTUALLY HAD ANY THOUGHTS OF KILLING YOURSELF?: NO
1. IN THE PAST MONTH, HAVE YOU WISHED YOU WERE DEAD OR WISHED YOU COULD GO TO SLEEP AND NOT WAKE UP?: NO
6. HAVE YOU EVER DONE ANYTHING, STARTED TO DO ANYTHING, OR PREPARED TO DO ANYTHING TO END YOUR LIFE?: NO

## 2024-09-13 ASSESSMENT — PAIN - FUNCTIONAL ASSESSMENT: PAIN_FUNCTIONAL_ASSESSMENT: 0-10

## 2024-09-13 ASSESSMENT — PAIN SCALES - GENERAL: PAINLEVEL_OUTOF10: 0 - NO PAIN

## 2024-09-13 NOTE — DISCHARGE INSTRUCTIONS
You received a unit of blood today to help with your chronic anemia.  Upon discharge from [facility] please follow-up with your regular hematologist in [place]

## 2024-09-13 NOTE — ED PROVIDER NOTES
HPI   Chief Complaint   Patient presents with    low hgb     PT was at [facility], sent down by their NP for low HGB, been feeling fatigue. PT is slightly pale.       Patient presents with low blood counts.  She states that she is up at [facility] and they sent her down because of low hemoglobin.  She is been feeling fatigued and looks slightly pale.  She states that she has had issues with anemia in the past.  She is from the [facility] and they have been given her iron transfusions and she has had blood transfusions in the past.  She states that she did have her menstrual cycle while upstairs at [facility] but it was not heavy and it was normal.  She has seen a hematologist and they do not know why she is anemic.  She is also on thyroid supplement due to having a history of thyroid cancer and a thyroidectomy.  They have been adjusting her Synthroid because of this.                          [place] Coma Scale Score: 15                  Patient History   No past medical history on file.  No past surgical history on file.  No family history on file.  Social History     Tobacco Use    Smoking status: Not on file    Smokeless tobacco: Not on file   Substance Use Topics    Alcohol use: Not on file    Drug use: Not on file       Physical Exam   ED Triage Vitals [09/13/24 1120]   Temperature Heart Rate Respirations BP   36.6 °C (97.9 °F) 68 18 106/72      Pulse Ox Temp src Heart Rate Source Patient Position   99 % -- -- --      BP Location FiO2 (%)     -- --       Physical Exam  Vitals and nursing note reviewed.   Constitutional:       Appearance: Normal appearance.   HENT:      Head: Normocephalic and atraumatic.      Mouth/Throat:      Mouth: Mucous membranes are moist.   Eyes:      Extraocular Movements: Extraocular movements intact.      Pupils: Pupils are equal, round, and reactive to light.   Cardiovascular:      Rate and Rhythm: Normal rate and regular rhythm.      Heart sounds: No murmur heard.  Pulmonary:       Effort: Pulmonary effort is normal. No respiratory distress.      Breath sounds: Normal breath sounds.   Abdominal:      General: There is no distension.      Palpations: Abdomen is soft.      Tenderness: There is no abdominal tenderness.   Musculoskeletal:         General: No tenderness or deformity. Normal range of motion.      Right lower leg: Edema present.      Left lower leg: Edema present.   Skin:     General: Skin is warm and dry.      Findings: No rash.   Neurological:      General: No focal deficit present.      Mental Status: She is alert and oriented to person, place, and time.      Sensory: No sensory deficit.      Motor: No weakness.   Psychiatric:         Behavior: Behavior normal.       Labs Reviewed   CBC WITH AUTO DIFFERENTIAL   BASIC METABOLIC PANEL   TYPE AND SCREEN   TSH WITH REFLEX TO FREE T4 IF ABNORMAL     No orders to display     ED Course & MDM   Diagnoses as of 09/13/24 1513   Anemia requiring transfusions       Medical Decision Making  Differentials include anemia, blood loss versus iron deficiency, hypothyroid. Imaging studies, if performed, were independently reviewed and interpreted by myself and confirmed by radiologist. EKG(s), if performed, were interpreted by myself.The patient had laboratory studies which showed a hemoglobin of 6.8.  MCV was 80.  This is a normocytic anemia.  Creatinine 1.07.  TSH is 62.  The patient was given 1 unit of packed red blood cells.  This is still infusing at this time.  Patient will be reevaluated by the oncoming physician for disposition, which will likely be discharged after blood transfusion is complete.        Procedure  Critical Care    Performed by: Ronnie Fleming MD  Authorized by: Ronnie Fleming MD    Critical care provider statement:     Critical care time (minutes):  30    Critical care time was exclusive of:  Separately billable procedures and treating other patients    Critical care was necessary to treat or prevent imminent or  life-threatening deterioration of the following conditions: anemia.    Critical care was time spent personally by me on the following activities:  Ordering and performing treatments and interventions, ordering and review of laboratory studies, evaluation of patient's response to treatment, re-evaluation of patient's condition and examination of patient       Ronnie Fleming MD  09/13/24 4783

## 2024-09-13 NOTE — PROGRESS NOTES
.  I assumed care of the patient at change of shift.  Patient was pending completion of her blood transfusion.  She has a history of chronic anemia requiring recurrent blood transfusion is from [facility] and here at [facility] for rehab.  She is asymptomatic she completed her 1 unit of packed red blood cells without any complication she is feeling comfortable and well enough for hospital discharge and agreed to be discharged back to [facility] for treatment.  Upon discharge from [facility] she was advised to follow-up closely with her hematologist in the [facility]

## 2024-09-15 LAB
ATRIAL RATE: 63 BPM
P AXIS: 39 DEGREES
PR INTERVAL: 141 MS
Q ONSET: 253 MS
QRS COUNT: 10 BEATS
QRS DURATION: 99 MS
QT INTERVAL: 447 MS
QTC CALCULATION(BAZETT): 458 MS
QTC FREDERICIA: 454 MS
R AXIS: 0 DEGREES
T AXIS: -1 DEGREES
T OFFSET: 477 MS
VENTRICULAR RATE: 63 BPM

## 2024-09-16 ENCOUNTER — PHARMACY VISIT (OUTPATIENT)
Dept: PHARMACY | Facility: CLINIC | Age: 44
End: 2024-09-16
Payer: MEDICARE

## 2024-09-16 ENCOUNTER — HOSPITAL ENCOUNTER (EMERGENCY)
Facility: HOSPITAL | Age: 44
Discharge: HOME | End: 2024-09-16
Payer: COMMERCIAL

## 2024-09-16 VITALS
OXYGEN SATURATION: 99 % | RESPIRATION RATE: 16 BRPM | DIASTOLIC BLOOD PRESSURE: 81 MMHG | WEIGHT: 230 LBS | HEART RATE: 64 BPM | BODY MASS INDEX: 32.93 KG/M2 | SYSTOLIC BLOOD PRESSURE: 117 MMHG | HEIGHT: 70 IN | TEMPERATURE: 97.2 F

## 2024-09-16 DIAGNOSIS — R42 LIGHTHEADEDNESS: Primary | ICD-10-CM

## 2024-09-16 LAB
ANION GAP SERPL CALC-SCNC: 10 MMOL/L (ref 10–20)
BASOPHILS # BLD AUTO: 0.06 X10*3/UL (ref 0–0.1)
BASOPHILS NFR BLD AUTO: 1.1 %
BUN SERPL-MCNC: 14 MG/DL (ref 6–23)
CALCIUM SERPL-MCNC: 8.3 MG/DL (ref 8.6–10.3)
CHLORIDE SERPL-SCNC: 103 MMOL/L (ref 98–107)
CO2 SERPL-SCNC: 28 MMOL/L (ref 21–32)
CREAT SERPL-MCNC: 0.97 MG/DL (ref 0.5–1.05)
EGFRCR SERPLBLD CKD-EPI 2021: 75 ML/MIN/1.73M*2
EOSINOPHIL # BLD AUTO: 0.15 X10*3/UL (ref 0–0.7)
EOSINOPHIL NFR BLD AUTO: 2.7 %
ERYTHROCYTE [DISTWIDTH] IN BLOOD BY AUTOMATED COUNT: 18.6 % (ref 11.5–14.5)
GLUCOSE SERPL-MCNC: 83 MG/DL (ref 74–99)
HCT VFR BLD AUTO: 26.3 % (ref 36–46)
HGB BLD-MCNC: 8 G/DL (ref 12–16)
IMM GRANULOCYTES # BLD AUTO: 0.01 X10*3/UL (ref 0–0.7)
IMM GRANULOCYTES NFR BLD AUTO: 0.2 % (ref 0–0.9)
LYMPHOCYTES # BLD AUTO: 1.35 X10*3/UL (ref 1.2–4.8)
LYMPHOCYTES NFR BLD AUTO: 24.7 %
MCH RBC QN AUTO: 24.5 PG (ref 26–34)
MCHC RBC AUTO-ENTMCNC: 30.4 G/DL (ref 32–36)
MCV RBC AUTO: 81 FL (ref 80–100)
MONOCYTES # BLD AUTO: 0.59 X10*3/UL (ref 0.1–1)
MONOCYTES NFR BLD AUTO: 10.8 %
NEUTROPHILS # BLD AUTO: 3.31 X10*3/UL (ref 1.2–7.7)
NEUTROPHILS NFR BLD AUTO: 60.5 %
NRBC BLD-RTO: 0 /100 WBCS (ref 0–0)
PLATELET # BLD AUTO: 228 X10*3/UL (ref 150–450)
POTASSIUM SERPL-SCNC: 4.2 MMOL/L (ref 3.5–5.3)
RBC # BLD AUTO: 3.26 X10*6/UL (ref 4–5.2)
SODIUM SERPL-SCNC: 137 MMOL/L (ref 136–145)
WBC # BLD AUTO: 5.5 X10*3/UL (ref 4.4–11.3)

## 2024-09-16 PROCEDURE — 85025 COMPLETE CBC W/AUTO DIFF WBC: CPT | Performed by: STUDENT IN AN ORGANIZED HEALTH CARE EDUCATION/TRAINING PROGRAM

## 2024-09-16 PROCEDURE — 99283 EMERGENCY DEPT VISIT LOW MDM: CPT

## 2024-09-16 PROCEDURE — RXMED WILLOW AMBULATORY MEDICATION CHARGE

## 2024-09-16 PROCEDURE — 80048 BASIC METABOLIC PNL TOTAL CA: CPT | Performed by: STUDENT IN AN ORGANIZED HEALTH CARE EDUCATION/TRAINING PROGRAM

## 2024-09-16 PROCEDURE — 36415 COLL VENOUS BLD VENIPUNCTURE: CPT | Performed by: STUDENT IN AN ORGANIZED HEALTH CARE EDUCATION/TRAINING PROGRAM

## 2024-09-16 RX ORDER — ROPINIROLE 0.25 MG/1
0.25 TABLET, FILM COATED ORAL 2 TIMES DAILY
Qty: 14 TABLET | Refills: 0 | OUTPATIENT
Start: 2024-09-16

## 2024-09-16 ASSESSMENT — COLUMBIA-SUICIDE SEVERITY RATING SCALE - C-SSRS
6. HAVE YOU EVER DONE ANYTHING, STARTED TO DO ANYTHING, OR PREPARED TO DO ANYTHING TO END YOUR LIFE?: NO
1. IN THE PAST MONTH, HAVE YOU WISHED YOU WERE DEAD OR WISHED YOU COULD GO TO SLEEP AND NOT WAKE UP?: NO
2. HAVE YOU ACTUALLY HAD ANY THOUGHTS OF KILLING YOURSELF?: NO

## 2024-09-16 ASSESSMENT — PAIN - FUNCTIONAL ASSESSMENT: PAIN_FUNCTIONAL_ASSESSMENT: 0-10

## 2024-09-16 ASSESSMENT — PAIN SCALES - GENERAL: PAINLEVEL_OUTOF10: 8

## 2024-09-16 ASSESSMENT — PAIN DESCRIPTION - LOCATION: LOCATION: ABDOMEN

## 2024-09-16 ASSESSMENT — PAIN DESCRIPTION - PAIN TYPE: TYPE: ACUTE PAIN

## 2024-09-17 NOTE — ED PROVIDER NOTES
EMERGENCY MEDICINE EVALUATION NOTE    History of Present Illness     Chief Complaint:   Chief Complaint   Patient presents with    Dizziness    Blurred Vision    transfused 9/13       HPI: Charito Daley is a 44 y.o. female presents with a chief complaint of multiple medical complaints.  Patient reports that she has had low bit of lightheaded dizziness.  States that she also has a little bit of blurred vision.  Primarily she is concerned for the dizziness.  She states he has had a little bit shortness of breath as well.  She states that all of these were related to her being anemic in the past.  She states that she was actually just transfused a few days ago.  She states that she is concerned she potentially was anemic again.  Patient has any new blood in her stool.  She reports that she was sent down from the rehab unit today to be evaluated.      Previous History   No past medical history on file.  No past surgical history on file.     No family history on file.  Allergies   Allergen Reactions    Benadryl [Diphenhydramine Hcl] Shortness of breath    Cephalosporins Anaphylaxis, Angioedema, Shortness of breath and Swelling    Penicillins Shortness of breath    Clindamycin Swelling    Phenergan [Promethazine] Headache     Current Outpatient Medications   Medication Instructions    aspirin 81 mg, oral, Every morning    aspirin 81 mg, oral, Every morning    aspirin 81 mg, oral, Every morning    buprenorphine-naloxone (Suboxone) 8-2 mg SL film 0.5 Film, sublingual, Every 6 hours PRN    buprenorphine-naloxone (Suboxone) 8-2 mg SL film 1 Film, sublingual, 2 times daily    buprenorphine-naloxone (Zubsolv) 5.7-1.4 mg SL tablet 1 tablet, sublingual, 2 times daily    cloNIDine (Catapres) 0.1 mg tablet Take 1 tablet by mouth every 6 hours as needed for anxiety and sweats    dicyclomine (Bentyl) 20 mg tablet Take 1 tablet by mouth every 6 hours as needed for stomach cramps    diphenhydrAMINE (BENADryl) 25 mg capsule Take 1  capsule by mouth every 6 hours as needed    furosemide (LASIX) 20 mg, oral, Daily    gabapentin (NEURONTIN) 600 mg, oral, 3 times daily    hydrOXYzine HCL (Atarax) 50 mg tablet Take 1 tablet by mouth every 8 hours as needed    levETIRAcetam (KEPPRA) 500 mg, oral, 2 times daily    levothyroxine (SYNTHROID, LEVOXYL) 175 mcg, oral, Every morning    levothyroxine (SYNTHROID, LEVOXYL) 175 mcg, oral, Every morning    levothyroxine (SYNTHROID, LEVOXYL) 175 mcg, oral, Every morning    levothyroxine (SYNTHROID, LEVOXYL) 200 mcg, oral, Every morning    loperamide (IMODIUM) 4 mg, oral, Every 6 hours PRN    melatonin 3 mg tablet Take 1 tablet by mouth once daily at bedtime as needed    melatonin 3 mg, oral, Nightly PRN    nicotine polacrilex (Nicorette) 2 mg gum Chew and park 1 piece every 4 hours as needed    ondansetron ODT (Zofran-ODT) 4 mg disintegrating tablet Dissolve 1 tablet in mouth three times daily as needed for nausea and vomiting    promethazine (Phenergan) 12.5 mg tablet Take 1-2 tablets by mouth every 6 hours as needed for nausea and vomiting    rOPINIRole (REQUIP) 0.25 mg, oral, 2 times daily    rOPINIRole (REQUIP) 0.25 mg, oral, 2 times daily    rOPINIRole (REQUIP) 0.25 mg, oral, 2 times daily    rOPINIRole (REQUIP) 0.25 mg, oral, 2 times daily    rOPINIRole (REQUIP) 0.25 mg, oral, 2 times daily    rOPINIRole (REQUIP) 0.25 mg, oral, 2 times daily    traZODone (Desyrel) 50 mg tablet Take 1 tablet by mouth once daily at bedtime as needed for insomnia       Physical Exam     Appearance: Alert, oriented , cooperative     Skin: Intact,  dry skin, no lesions, rash, petechiae or purpura.      Eyes: PERRLA, EOMs intact,  Conjunctiva pink      ENT: Hearing grossly intact. Pharynx clear     Neck: Supple. Trachea at midline.      Pulmonary: Clear bilaterally. No rales, rhonchi or wheezing. No accessory muscle use or stridor.     Cardiac: Normal rate and rhythm without murmur     Abdomen: Soft, nontender, active bowel  "sounds.     Musculoskeletal: Full range of motion.      Neurological:Cranial nerves II through XII are grossly intact, normal sensation, no weakness, no focal findings identified.     Results     Labs Reviewed   CBC WITH AUTO DIFFERENTIAL - Abnormal       Result Value    WBC 5.5      nRBC 0.0      RBC 3.26 (*)     Hemoglobin 8.0 (*)     Hematocrit 26.3 (*)     MCV 81      MCH 24.5 (*)     MCHC 30.4 (*)     RDW 18.6 (*)     Platelets 228      Neutrophils % 60.5      Immature Granulocytes %, Automated 0.2      Lymphocytes % 24.7      Monocytes % 10.8      Eosinophils % 2.7      Basophils % 1.1      Neutrophils Absolute 3.31      Immature Granulocytes Absolute, Automated 0.01      Lymphocytes Absolute 1.35      Monocytes Absolute 0.59      Eosinophils Absolute 0.15      Basophils Absolute 0.06     BASIC METABOLIC PANEL - Abnormal    Glucose 83      Sodium 137      Potassium 4.2      Chloride 103      Bicarbonate 28      Anion Gap 10      Urea Nitrogen 14      Creatinine 0.97      eGFR 75      Calcium 8.3 (*)      No orders to display         ED Course & Medical Decision Making   Medications - No data to display  Heart Rate:  [64-69]   Temperature:  [36.2 °C (97.2 °F)]   Respirations:  [16-18]   BP: (116-117)/(79-81)   Height:  [177.8 cm (5' 10\")]   Weight:  [104 kg (230 lb)]   Pulse Ox:  [99 %-100 %]    ED Course as of 09/16/24 2216   Mon Sep 16, 2024   2008 Care for the patient was assumed at this time.  Patient came in today concern for anemia.  She states that she had episodes of feeling a little short of breath little lightheaded a little dizzy.  She states that she is upstairs at the rehab unit.  She reports that she was sent down here last week and she actually ended up needing a blood transfusion secondary to symptoms very similar to this.  She states that when she started complaining of this they sent her back down here to have her hemoglobin redrawn today.  Patient's hemoglobin today is 8 which would not " require any blood transfusion.  Patient denied any black or tarry stool she denying other sources of bleeding.  Patient is panicky going forward.  She still a little dizzy and occasionally said her vision got a little blurry she was offered CT imaging as well as EKG and further workup.  She states that as long as her hemoglobin is normal she would like to be discharged because she does not want to delay her rehab progress.  She states that she is due to start the next phase tomorrow and does not want that to be pushed back secondary to being here in the ER or the hospital.  Patient encouraged return here immediately with any worsening symptoms but through joint decision-making patient will be discharged at this time to go back upstairs to the rehab facility. [CJ]      ED Course User Index  [CJ] Juan A Mosley PA-C         Diagnoses as of 09/16/24 2216   Lightheadedness       Procedures   Procedures    Diagnosis     1. Lightheadedness        Disposition   Discharged    ED Prescriptions    None         Disclaimer: This note was dictated by speech recognition. Minor errors in transcription may be present. Please call if questions.       Juan A Mosley PA-C  09/16/24 2218

## 2024-10-07 ENCOUNTER — OFFICE VISIT (OUTPATIENT)
Dept: CARDIOLOGY | Facility: CLINIC | Age: 44
End: 2024-10-07
Payer: COMMERCIAL

## 2024-10-07 VITALS
OXYGEN SATURATION: 96 % | SYSTOLIC BLOOD PRESSURE: 111 MMHG | DIASTOLIC BLOOD PRESSURE: 76 MMHG | WEIGHT: 225 LBS | BODY MASS INDEX: 32.21 KG/M2 | HEIGHT: 70 IN | HEART RATE: 76 BPM

## 2024-10-07 DIAGNOSIS — D50.0 IRON DEFICIENCY ANEMIA DUE TO CHRONIC BLOOD LOSS: ICD-10-CM

## 2024-10-07 DIAGNOSIS — R60.0 BILATERAL LOWER EXTREMITY EDEMA: ICD-10-CM

## 2024-10-07 DIAGNOSIS — E78.5 HYPERLIPIDEMIA, UNSPECIFIED HYPERLIPIDEMIA TYPE: Primary | ICD-10-CM

## 2024-10-07 DIAGNOSIS — R06.02 SHORTNESS OF BREATH: ICD-10-CM

## 2024-10-07 DIAGNOSIS — R07.89 ATYPICAL CHEST PAIN: ICD-10-CM

## 2024-10-07 PROCEDURE — 99406 BEHAV CHNG SMOKING 3-10 MIN: CPT | Performed by: INTERNAL MEDICINE

## 2024-10-07 PROCEDURE — 99204 OFFICE O/P NEW MOD 45 MIN: CPT | Performed by: INTERNAL MEDICINE

## 2024-10-07 PROCEDURE — 99214 OFFICE O/P EST MOD 30 MIN: CPT | Performed by: INTERNAL MEDICINE

## 2024-10-07 PROCEDURE — 3008F BODY MASS INDEX DOCD: CPT | Performed by: INTERNAL MEDICINE

## 2024-10-07 RX ORDER — SPIRONOLACTONE 25 MG/1
25 TABLET ORAL DAILY
Qty: 30 TABLET | Refills: 11 | Status: SHIPPED | OUTPATIENT
Start: 2024-10-07 | End: 2025-10-07

## 2024-10-07 RX ORDER — ATORVASTATIN CALCIUM 20 MG/1
20 TABLET, FILM COATED ORAL DAILY
Start: 2024-10-07 | End: 2025-10-07

## 2024-10-07 ASSESSMENT — PAIN SCALES - GENERAL: PAINLEVEL: 0-NO PAIN

## 2024-10-07 ASSESSMENT — PATIENT HEALTH QUESTIONNAIRE - PHQ9
2. FEELING DOWN, DEPRESSED OR HOPELESS: NOT AT ALL
SUM OF ALL RESPONSES TO PHQ9 QUESTIONS 1 AND 2: 0
1. LITTLE INTEREST OR PLEASURE IN DOING THINGS: NOT AT ALL

## 2024-10-07 NOTE — LETTER
October 7, 2024     Patient: Charito Daley   YOB: 1980   Date of Visit: 10/7/2024       To Whom It May Concern:    Charito Daley was seen in my clinic on 10/7/2024 at 11:00 am. Please excuse Charito for her absence on this day to make the appointment.    If you have any questions or concerns, please don't hesitate to call.         Sincerely,         Shane Dean MD        CC: No Recipients

## 2024-10-07 NOTE — PROGRESS NOTES
44 year old female here for heart failure evaluation.       Social Hx: Denies smoking, ETOH, illicits  Family Hx: Father  at 52 due to a MI, brother  at 31 due to CHF (CAD), her twin sister had open heart surgery at 8 years old (has CAD and CHF).     Interval Hx:   Currently denies chest pain, palpitations, shortness of breath, PND. No edema noted in BLE. Patient denies headaches, dizziness or recent falls.   Patient states LEON, BLE edema present, headaches, and orthopnea.   She is with her  today and is currently enrolled at [facility] in [place].     Hospitalizations: Very frequent ED visits

## 2024-10-07 NOTE — PATIENT INSTRUCTIONS
To reach Dr. Dean's office please call Shawna: [phone]. Fax [phone]. Call [phone] to schedule an appointment. You may also contact the HF RNs at [email or web address]    Stop aspirin for now  Start spironolactone 25mg per day to help with potassium and swelling  Set up echocardiogram (not stress)   Get bloodwork the same day   Return in 3 months  I referred you to hematology today

## 2024-10-07 NOTE — PROGRESS NOTES
"Advanced [facility] Failure Clinic Note    CHIEF COMPLAINT:    Chief Complaint   Patient presents with    New Patient Visit        Primary Care Physician: none    HISTORY OF PRESENT ILLNESS:   Charito Daley is a 44 y.o. 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  She has had several admission showing mildly elevated serum troponin but no substantial elevation. Denies history of hypertension, was previously on clonidine but more for anxiety    During her recent ER visit she was started on furosemide with some improvement and it was recently increased to 40 mg. She is now on aspirin for \"heart attack\" years ago but no history of PCI or even angiogram     Her other history has been notable for severe chronic anemia previously diagnosed with iron deficiency she was on intravenous iron but has not been seeing a hematologist recently she does not take oral iron because she does not believe it works.  She has a history of thyroidectomy for papillary thyroid cancer in 2016 was severely hypothyroid earlier this year after not consistently taking her medication.  She is a history of umbilical hernia repair but no intestinal surgeries history of seizures now on Keppra with no recent episodes. Her chest pain and edema were worked up with several negative CTPA and LE venous " dopplers    She is currently living in [place] in a recovery house she is here with her significant other.  She does have a strong family history of coronary disease father  of heart attack in his 50s Brother had a heart attack in his 30s she has a twin sister with some sort of congenital heart disease who  as a complication of heart failure. Currently using methadone and smokes cigarettes, history of heroin, cocaine and amphetamine use none in the last few weeks    Currently has shortness of breath and leg swelling with more than short exertion. Appetite is ok, she has frequent       Monitors/restricts salt/fluid : not really   Has scale and weighs self daily: no  Has BP cuff and BPs at home:  no      Allergies   Allergen Reactions    Benadryl [Diphenhydramine Hcl] Shortness of breath    Cephalosporins Anaphylaxis, Angioedema, Shortness of breath and Swelling    Penicillins Shortness of breath    Clindamycin Swelling    Phenergan [Promethazine] Headache       CURRENT MEDICATIONS:    Current Outpatient Medications   Medication Sig Dispense Refill    cloNIDine (Catapres) 0.1 mg tablet Take 1 tablet by mouth every 6 hours as needed for anxiety and sweats 20 tablet 0    dicyclomine (Bentyl) 20 mg tablet Take 1 tablet by mouth every 6 hours as needed for stomach cramps 10 tablet 0    furosemide (Lasix) 20 mg tablet Take 1 tablet (20 mg) by mouth once daily. (Patient taking differently: Take 2 tablets (40 mg) by mouth once daily.) 14 tablet 0    gabapentin (Neurontin) 600 mg tablet Take 1 tablet (600 mg) by mouth 3 times a day. 28 tablet 0    levETIRAcetam (Keppra) 500 mg tablet Take 1 tablet (500 mg) by mouth 2 times a day. 30 tablet 0    levothyroxine (Synthroid, Levoxyl) 200 mcg tablet Take 1 tablet (200 mcg) by mouth once daily in the morning. 7 tablet 0    loperamide (Imodium) 2 mg capsule Take 2 capsules (4 mg) by mouth every 6 hours if needed. 16 capsule 0    melatonin 3 mg tablet Take 1 tablet (3 mg) by  mouth as needed at bedtime. 10 tablet 0    ondansetron ODT (Zofran-ODT) 4 mg disintegrating tablet Dissolve 1 tablet in mouth three times daily as needed for nausea and vomiting 10 tablet 0    atorvastatin (Lipitor) 20 mg tablet Take 1 tablet (20 mg) by mouth once daily.      diphenhydrAMINE (BENADryl) 25 mg capsule Take 1 capsule by mouth every 6 hours as needed (Patient not taking: Reported on 10/7/2024) 12 capsule 0    promethazine (Phenergan) 12.5 mg tablet Take 1-2 tablets by mouth every 6 hours as needed for nausea and vomiting 20 tablet 0    rOPINIRole (Requip) 0.25 mg tablet Take 1 tablet (0.25 mg) by mouth 2 times a day. 14 tablet 0    spironolactone (Aldactone) 25 mg tablet Take 1 tablet (25 mg) by mouth once daily. 30 tablet 11     No current facility-administered medications for this visit.         Objective    Problems:   Patient Active Problem List   Diagnosis    Atypical chest pain    Hyperlipidemia    Bilateral lower extremity edema    Iron deficiency anemia due to chronic blood loss    Shortness of breath     Medical History:   No past medical history on file.   has no past medical history of Diabetes mellitus (Multi).  Surgical Hx:   No past surgical history on file.    has no past surgical history on file.  Social Hx:   Tobacco Use: High Risk (10/7/2024)    Patient History     Smoking Tobacco Use: Every Day     Smokeless Tobacco Use: Current     Passive Exposure: Not on file     Alcohol Use: Not on file      reports that she has been smoking cigarettes. She uses smokeless tobacco. She reports that she does not currently use alcohol. She reports that she does not currently use drugs after having used the following drugs: Methamphetamines.   Family Hx:   No family history on file.  family history is not on file.  Allergies:   Allergies   Allergen Reactions    Benadryl [Diphenhydramine Hcl] Shortness of breath    Cephalosporins Anaphylaxis, Angioedema, Shortness of breath and Swelling    Penicillins  "Shortness of breath    Clindamycin Swelling    Phenergan [Promethazine] Headache          PHYSICAL EXAM:   Body mass index is 32.28 kg/m².    TRENDS:   Vitals:       2024     1:21 PM 2024     1:36 PM 2024     3:20 PM 2024     4:40 PM 2024     6:54 PM 2024     8:04 PM 10/7/2024    10:23 AM   Vitals   Systolic 103 97 93 104 116 117 111   Diastolic 62 77 66 67 79 81 76   Heart Rate 57 58 60 59 69 64 76   Temp 36.8 °C (98.2 °F) 36.7 °C (98.1 °F)  36.7 °C (98.1 °F) 36.2 °C (97.2 °F)     Resp 18 18 18 18 18 16    Height (in)     1.778 m (5' 10\")  1.778 m (5' 10\")   Weight (lb)     230  225   BMI     33 kg/m2  32.28 kg/m2   BSA (m2)     2.27 m2  2.24 m2   Visit Report       Report     WEIGHT TREND:   Wt Readings from Last 5 Encounters:   10/07/24 102 kg (225 lb)   24 104 kg (230 lb)   24 107 kg (235 lb)   24 107 kg (235 lb)   24 99.8 kg (220 lb)       Objective      44 y.o.year old female, awake alert orient X 3 in NAD CVP does not seem elevated heart is regular without murmurs lungs were clear abdomen nontender extremities are warm with 1-2+ edema    LABS:    RESUFAST(CHOL:1,HDL:1,LDLF:1,TRI): No results found for: \"CHOL\", \"HDL\", \"LDLF\", \"TRIG\"  GETLABS(6M,2):   Admission on 2024, Discharged on 2024   Component Date Value    WBC 2024 5.5     nRBC 2024 0.0     RBC 2024 3.26 (L)     Hemoglobin 2024 8.0 (L)     Hematocrit 2024 26.3 (L)     MCV 2024 81     MCH 2024 24.5 (L)     MCHC 2024 30.4 (L)     RDW 2024 18.6 (H)     Platelets 2024 228     Neutrophils % 2024 60.5     Immature Granulocytes %,* 2024 0.2     Lymphocytes % 2024 24.7     Monocytes % 2024 10.8     Eosinophils % 2024 2.7     Basophils % 2024 1.1     Neutrophils Absolute 2024 3.31     Immature Granulocytes Ab* 2024 0.01     Lymphocytes Absolute 2024 1.35     Monocytes Absolute " 09/16/2024 0.59     Eosinophils Absolute 09/16/2024 0.15     Basophils Absolute 09/16/2024 0.06     Glucose 09/16/2024 83     Sodium 09/16/2024 137     Potassium 09/16/2024 4.2     Chloride 09/16/2024 103     Bicarbonate 09/16/2024 28     Anion Gap 09/16/2024 10     Urea Nitrogen 09/16/2024 14     Creatinine 09/16/2024 0.97     eGFR 09/16/2024 75     Calcium 09/16/2024 8.3 (L)    Admission on 09/13/2024, Discharged on 09/13/2024   Component Date Value    WBC 09/13/2024 4.2 (L)     nRBC 09/13/2024 0.0     RBC 09/13/2024 2.79 (L)     Hemoglobin 09/13/2024 6.8 (L)     Hematocrit 09/13/2024 22.2 (L)     MCV 09/13/2024 80     MCH 09/13/2024 24.4 (L)     MCHC 09/13/2024 30.6 (L)     RDW 09/13/2024 19.0 (H)     Platelets 09/13/2024 232     Neutrophils % 09/13/2024 54.5     Immature Granulocytes %,* 09/13/2024 0.2     Lymphocytes % 09/13/2024 33.3     Monocytes % 09/13/2024 8.4     Eosinophils % 09/13/2024 2.4     Basophils % 09/13/2024 1.2     Neutrophils Absolute 09/13/2024 2.26     Immature Granulocytes Ab* 09/13/2024 0.01     Lymphocytes Absolute 09/13/2024 1.38     Monocytes Absolute 09/13/2024 0.35     Eosinophils Absolute 09/13/2024 0.10     Basophils Absolute 09/13/2024 0.05     Glucose 09/13/2024 79     Sodium 09/13/2024 135 (L)     Potassium 09/13/2024 4.0     Chloride 09/13/2024 101     Bicarbonate 09/13/2024 31     Anion Gap 09/13/2024 7 (L)     Urea Nitrogen 09/13/2024 18     Creatinine 09/13/2024 1.07 (H)     eGFR 09/13/2024 66     Calcium 09/13/2024 7.8 (L)     ABO TYPE 09/13/2024 A     Rh TYPE 09/13/2024 POS     ANTIBODY SCREEN 09/13/2024 NEG     Thyroid Stimulating Horm* 09/13/2024 62.00 (H)     ABO TYPE 09/13/2024 A     Rh TYPE 09/13/2024 POS     RBC Morphology 09/13/2024 See Below     Polychromasia 09/13/2024 Mild     Hypochromia 09/13/2024 Mild     PRODUCT CODE 09/13/2024 [id number]     Unit Number 09/13/2024 [id number]     Unit ABO 09/13/2024 A     Unit RH 09/13/2024 POS     XM INTEP 09/13/2024 COMP      Dispense Status 09/13/2024 TR     Blood Expiration Date 09/13/2024 10/1/2024 11:59:00 PM EDT     PRODUCT BLOOD TYPE 09/13/2024 6200     UNIT VOLUME 09/13/2024 350     Thyroxine, Free 09/13/2024 0.80    Admission on 09/11/2024, Discharged on 09/12/2024   Component Date Value    WBC 09/11/2024 5.7     nRBC 09/11/2024 0.0     RBC 09/11/2024 2.97 (L)     Hemoglobin 09/11/2024 7.3 (L)     Hematocrit 09/11/2024 23.9 (L)     MCV 09/11/2024 81     MCH 09/11/2024 24.6 (L)     MCHC 09/11/2024 30.5 (L)     RDW 09/11/2024 19.0 (H)     Platelets 09/11/2024 242     Neutrophils % 09/11/2024 60.8     Immature Granulocytes %,* 09/11/2024 0.4     Lymphocytes % 09/11/2024 24.8     Monocytes % 09/11/2024 9.8     Eosinophils % 09/11/2024 2.6     Basophils % 09/11/2024 1.6     Neutrophils Absolute 09/11/2024 3.46     Immature Granulocytes Ab* 09/11/2024 0.02     Lymphocytes Absolute 09/11/2024 1.41     Monocytes Absolute 09/11/2024 0.56     Eosinophils Absolute 09/11/2024 0.15     Basophils Absolute 09/11/2024 0.09     Glucose 09/11/2024 79     Sodium 09/11/2024 136     Potassium 09/11/2024 4.0     Chloride 09/11/2024 102     Bicarbonate 09/11/2024 29     Anion Gap 09/11/2024 9 (L)     Urea Nitrogen 09/11/2024 17     Creatinine 09/11/2024 1.02     eGFR 09/11/2024 70     Calcium 09/11/2024 8.2 (L)     Albumin 09/11/2024 3.8     Alkaline Phosphatase 09/11/2024 46     Total Protein 09/11/2024 7.3     AST 09/11/2024 51 (H)     Bilirubin, Total 09/11/2024 0.3     ALT 09/11/2024 42     BNP 09/11/2024 288 (H)     D-Dimer Non VTE, Quant (* 09/11/2024 1,286 (H)     Ventricular Rate 09/11/2024 63     Atrial Rate 09/11/2024 63     WV Interval 09/11/2024 141     QRS Duration 09/11/2024 99     QT Interval 09/11/2024 447     QTC Calculation(Bazett) 09/11/2024 458     P Axis 09/11/2024 39     R Axis 09/11/2024 0     T Axis 09/11/2024 -1     QRS Count 09/11/2024 10     Q Onset 09/11/2024 253     T Offset 09/11/2024 477     QTC Fredericia 09/11/2024  454     Troponin I, High Sensiti* 09/11/2024 4     Troponin I, High Sensiti* 09/11/2024 3    Lab Requisition on 09/11/2024   Component Date Value    Glucose 09/11/2024 77     Sodium 09/11/2024 138     Potassium 09/11/2024 4.0     Chloride 09/11/2024 103     Bicarbonate 09/11/2024 30     Anion Gap 09/11/2024 9 (L)     Urea Nitrogen 09/11/2024 17     Creatinine 09/11/2024 1.06 (H)     eGFR 09/11/2024 67     Calcium 09/11/2024 8.3 (L)     Albumin 09/11/2024 3.6     Alkaline Phosphatase 09/11/2024 42     Total Protein 09/11/2024 7.0     AST 09/11/2024 47 (H)     Bilirubin, Total 09/11/2024 0.4     ALT 09/11/2024 39     WBC 09/11/2024 5.0     nRBC 09/11/2024 0.0     RBC 09/11/2024 2.90 (L)     Hemoglobin 09/11/2024 7.0 (L)     Hematocrit 09/11/2024 23.2 (L)     MCV 09/11/2024 80     MCH 09/11/2024 24.1 (L)     MCHC 09/11/2024 30.2 (L)     RDW 09/11/2024 19.2 (H)     Platelets 09/11/2024 230     Neutrophils % 09/11/2024 61.9     Immature Granulocytes %,* 09/11/2024 0.2     Lymphocytes % 09/11/2024 24.9     Monocytes % 09/11/2024 9.0     Eosinophils % 09/11/2024 2.8     Basophils % 09/11/2024 1.2     Neutrophils Absolute 09/11/2024 3.11     Immature Granulocytes Ab* 09/11/2024 0.01     Lymphocytes Absolute 09/11/2024 1.25     Monocytes Absolute 09/11/2024 0.45     Eosinophils Absolute 09/11/2024 0.14     Basophils Absolute 09/11/2024 0.06     Thyroid Stimulating Horm* 09/11/2024 63.00 (H)     Triiodothyronine 09/11/2024 105     Thyroxine 09/11/2024 9.8    Lab Requisition on 09/05/2024   Component Date Value    WBC 09/05/2024 4.4     nRBC 09/05/2024 0.0     RBC 09/05/2024 3.34 (L)     Hemoglobin 09/05/2024 8.2 (L)     Hematocrit 09/05/2024 26.8 (L)     MCV 09/05/2024 80     MCH 09/05/2024 24.6 (L)     MCHC 09/05/2024 30.6 (L)     RDW 09/05/2024 20.0 (H)     Platelets 09/05/2024 308     Neutrophils % 09/05/2024 56.4     Immature Granulocytes %,* 09/05/2024 0.5     Lymphocytes % 09/05/2024 31.1     Monocytes % 09/05/2024 9.1      Eosinophils % 09/05/2024 1.8     Basophils % 09/05/2024 1.1     Neutrophils Absolute 09/05/2024 2.48     Immature Granulocytes Ab* 09/05/2024 0.02     Lymphocytes Absolute 09/05/2024 1.37     Monocytes Absolute 09/05/2024 0.40     Eosinophils Absolute 09/05/2024 0.08     Basophils Absolute 09/05/2024 0.05     Glucose 09/05/2024 79     Sodium 09/05/2024 138     Potassium 09/05/2024 4.4     Chloride 09/05/2024 100     Bicarbonate 09/05/2024 29     Anion Gap 09/05/2024 13     Urea Nitrogen 09/05/2024 23     Creatinine 09/05/2024 1.15 (H)     eGFR 09/05/2024 61     Calcium 09/05/2024 8.1 (L)     Albumin 09/05/2024 3.6     Alkaline Phosphatase 09/05/2024 43     Total Protein 09/05/2024 6.6     AST 09/05/2024 48 (H)     Bilirubin, Total 09/05/2024 0.4     ALT 09/05/2024 42     Thyroid Stimulating Horm* 09/05/2024 83.00 (H)     Thyroxine 09/05/2024 8.5     Triiodothyronine 09/05/2024 100     Extra Tube 09/05/2024 Hold for add-ons.     Extra Tube 09/05/2024 Hold for add-ons.    Admission on 08/27/2024, Discharged on 08/28/2024   Component Date Value    WBC 08/27/2024 4.8     nRBC 08/27/2024 0.0     RBC 08/27/2024 3.28 (L)     Hemoglobin 08/27/2024 8.3 (L)     Hematocrit 08/27/2024 26.8 (L)     MCV 08/27/2024 82     MCH 08/27/2024 25.3 (L)     MCHC 08/27/2024 31.0 (L)     RDW 08/27/2024 21.8 (H)     Platelets 08/27/2024 240     Neutrophils % 08/27/2024 62.1     Immature Granulocytes %,* 08/27/2024 0.4     Lymphocytes % 08/27/2024 27.6     Monocytes % 08/27/2024 8.0     Eosinophils % 08/27/2024 0.8     Basophils % 08/27/2024 1.1     Neutrophils Absolute 08/27/2024 2.95     Immature Granulocytes Ab* 08/27/2024 0.02     Lymphocytes Absolute 08/27/2024 1.31     Monocytes Absolute 08/27/2024 0.38     Eosinophils Absolute 08/27/2024 0.04     Basophils Absolute 08/27/2024 0.05     Glucose 08/27/2024 97     Sodium 08/27/2024 137     Potassium 08/27/2024 3.9     Chloride 08/27/2024 105     Bicarbonate 08/27/2024 28     Anion Gap  08/27/2024 8 (L)     Urea Nitrogen 08/27/2024 23     Creatinine 08/27/2024 1.15 (H)     eGFR 08/27/2024 61     Calcium 08/27/2024 8.4 (L)     Albumin 08/27/2024 3.9     Alkaline Phosphatase 08/27/2024 47     Total Protein 08/27/2024 7.3     AST 08/27/2024 57 (H)     Bilirubin, Total 08/27/2024 0.4     ALT 08/27/2024 44     HCG, Urine 08/27/2024 NEGATIVE     Color, Urine 08/27/2024 Yellow     Appearance, Urine 08/27/2024 Clear     Specific Gravity, Urine 08/27/2024 1.031     pH, Urine 08/27/2024 6.0     Protein, Urine 08/27/2024 10 (TRACE)     Glucose, Urine 08/27/2024 Normal     Blood, Urine 08/27/2024 NEGATIVE     Ketones, Urine 08/27/2024 NEGATIVE     Bilirubin, Urine 08/27/2024 NEGATIVE     Urobilinogen, Urine 08/27/2024 2 (1+) (A)     Nitrite, Urine 08/27/2024 NEGATIVE     Leukocyte Esterase, Urine 08/27/2024 25 Shun/µL (A)     Extra Tube 08/27/2024 Hold for add-ons.     WBC, Urine 08/27/2024 21-50 (A)     RBC, Urine 08/27/2024 NONE     Squamous Epithelial Cell* 08/27/2024 1-9 (SPARSE)     Urine Culture 08/27/2024 No growth     RBC Morphology 08/27/2024 See Below     Ovalocytes 08/27/2024 Few     Acanthocytes 08/27/2024 Few    Lab Requisition on 08/23/2024   Component Date Value    Coronavirus 2019, PCR 08/23/2024 Not Detected    Lab Requisition on 08/23/2024   Component Date Value    Glucose 08/23/2024 82     Sodium 08/23/2024 138     Potassium 08/23/2024 4.2     Chloride 08/23/2024 104     Bicarbonate 08/23/2024 28     Anion Gap 08/23/2024 10     Urea Nitrogen 08/23/2024 19     Creatinine 08/23/2024 1.10 (H)     eGFR 08/23/2024 64     Calcium 08/23/2024 8.6     Albumin 08/23/2024 4.0     Alkaline Phosphatase 08/23/2024 52     Total Protein 08/23/2024 7.5     AST 08/23/2024 65 (H)     Bilirubin, Total 08/23/2024 0.6     ALT 08/23/2024 45     WBC 08/23/2024 4.7     nRBC 08/23/2024 0.0     RBC 08/23/2024 4.00     Hemoglobin 08/23/2024 10.0 (L)     Hematocrit 08/23/2024 32.0 (L)     MCV 08/23/2024 80     MCH  08/23/2024 25.0 (L)     MCHC 08/23/2024 31.3 (L)     RDW 08/23/2024 21.9 (H)     Platelets 08/23/2024 245     Neutrophils % 08/23/2024 62.8     Immature Granulocytes %,* 08/23/2024 0.4     Lymphocytes % 08/23/2024 25.8     Monocytes % 08/23/2024 8.4     Eosinophils % 08/23/2024 1.5     Basophils % 08/23/2024 1.1     Neutrophils Absolute 08/23/2024 2.93     Immature Granulocytes Ab* 08/23/2024 0.02     Lymphocytes Absolute 08/23/2024 1.20     Monocytes Absolute 08/23/2024 0.39     Eosinophils Absolute 08/23/2024 0.07     Basophils Absolute 08/23/2024 0.05     HIV 1/2 Antigen/Antibody* 08/23/2024 Nonreactive     Syphilis Total Ab 08/23/2024 Nonreactive     Hepatitis B Surface AG 08/23/2024 Nonreactive     Hepatitis A  AB- IgM 08/23/2024 Nonreactive     Hepatitis B Core AB; IgM 08/23/2024 Nonreactive     Hepatitis C AB 08/23/2024 Reactive (A)     Alcohol 08/23/2024 <10     RBC Morphology 08/23/2024 See Below     Ovalocytes 08/23/2024 Few     Acanthocytes 08/23/2024 Few     HCV PCR Quant 08/23/2024 716,000 (H)     Hepatitis C RNA PCR Log 08/23/2024 5.85     HCV RNA Result 08/23/2024 Detected (A)    Lab Requisition on 08/21/2024   Component Date Value    Amphetamine Screen, Urine 08/21/2024 Presumptive Positive (A)     Barbiturate Screen, Urine 08/21/2024 Presumptive Negative     Benzodiazepines Screen, * 08/21/2024 Presumptive Negative     Cannabinoid Screen, Urine 08/21/2024 Presumptive Negative     Cocaine Metabolite Scree* 08/21/2024 Presumptive Negative     Fentanyl Screen, Urine 08/21/2024 Presumptive Positive (A)     Opiate Screen, Urine 08/21/2024 Presumptive Positive (A)     Oxycodone Screen, Urine 08/21/2024 Presumptive Negative     PCP Screen, Urine 08/21/2024 Presumptive Negative     Methadone Screen, Urine 08/21/2024 Presumptive Negative     Buprenorphine Screen, Ur* 08/21/2024 PresumptivePOS     Buprenorphine,Urine Scre* 08/21/2024 See Note     GABAPENTIN,URINE  08/21/2024 >500.0     BUPRENORPHINE GLUC,  URINE 08/21/2024 37     BUPRENORPHINE ,URINE 08/21/2024 <2     NALOXONE, URINE 08/21/2024 <100     NORBUPRENORPHINE GLUC,UR* 08/21/2024 495     NORBUPRENORPHINE, URINE 08/21/2024 51    Admission on 08/17/2024, Discharged on 08/17/2024   Component Date Value    WBC 08/17/2024 3.9 (L)     nRBC 08/17/2024 0.0     RBC 08/17/2024 4.09     Hemoglobin 08/17/2024 10.2 (L)     Hematocrit 08/17/2024 32.4 (L)     MCV 08/17/2024 79 (L)     MCH 08/17/2024 24.9 (L)     MCHC 08/17/2024 31.5 (L)     RDW 08/17/2024 22.3 (H)     Platelets 08/17/2024 188     Neutrophils % 08/17/2024 55.8     Immature Granulocytes %,* 08/17/2024 0.3     Lymphocytes % 08/17/2024 34.6     Monocytes % 08/17/2024 6.7     Eosinophils % 08/17/2024 1.0     Basophils % 08/17/2024 1.6     Neutrophils Absolute 08/17/2024 2.16     Immature Granulocytes Ab* 08/17/2024 0.01     Lymphocytes Absolute 08/17/2024 1.34     Monocytes Absolute 08/17/2024 0.26     Eosinophils Absolute 08/17/2024 0.04     Basophils Absolute 08/17/2024 0.06     Glucose 08/17/2024 77     Sodium 08/17/2024 139     Potassium 08/17/2024 3.8     Chloride 08/17/2024 105     Bicarbonate 08/17/2024 31     Anion Gap 08/17/2024 7 (L)     Urea Nitrogen 08/17/2024 16     Creatinine 08/17/2024 1.27 (H)     eGFR 08/17/2024 54 (L)     Calcium 08/17/2024 8.8     Albumin 08/17/2024 3.6     Alkaline Phosphatase 08/17/2024 35     Total Protein 08/17/2024 7.0     AST 08/17/2024 63 (H)     Bilirubin, Total 08/17/2024 0.5     ALT 08/17/2024 35     BNP 08/17/2024 22     Ventricular Rate 08/17/2024 57     Atrial Rate 08/17/2024 0     MT Interval 08/17/2024 122     QRS Duration 08/17/2024 97     QT Interval 08/17/2024 450     QTC Calculation(Bazett) 08/17/2024 439     P Axis 08/17/2024 5     R Bandera 08/17/2024 3     QRS Count 08/17/2024 9     Q Onset 08/17/2024 252     T Offset 08/17/2024 477     QTC Fredericia 08/17/2024 442     Troponin I, High Sensiti* 08/17/2024 6     Troponin I, High Sensiti* 08/17/2024 5      "RBC Morphology 08/17/2024 See Below     Hypochromia 08/17/2024 Mild     Ovalocytes 08/17/2024 Few     Melquiades Cells 08/17/2024 Few     D-Dimer Non VTE, Quant (* 08/17/2024 738 (H)    There may be more visits with results that are not included.     LABBRIEF(HGB:3)   Lab Results   Component Value Date    HGB 8.2 (L) 10/08/2024    HGB 8.0 (L) 09/16/2024    HGB 6.8 (L) 09/13/2024       CMP:  Recent Labs     10/08/24  1020 09/16/24  1910 09/13/24  1153 09/11/24  2052 09/11/24  1103    137 135* 136 138   K 4.2 4.2 4.0 4.0 4.0    103 101 102 103   CO2 29 28 31 29 30   ANIONGAP 10 10 7* 9* 9*   BUN 11 14 18 17 17   CREATININE 0.72 0.97 1.07* 1.02 1.06*   EGFR >90 75 66 70 67     Recent Labs     09/11/24 2052 09/11/24  1103 09/05/24  1050 08/27/24  2356 08/23/24  1050   ALBUMIN 3.8 3.6 3.6 3.9 4.0   ALKPHOS 46 42 43 47 52   ALT 42 39 42 44 45   AST 51* 47* 48* 57* 65*   BILITOT 0.3 0.4 0.4 0.4 0.6     CBC:  Recent Labs     10/08/24  1020 09/16/24  1910 09/13/24  1153 09/11/24  2052 09/11/24  1103   WBC 5.3 5.5 4.2* 5.7 5.0   HGB 8.2* 8.0* 6.8* 7.3* 7.0*   HCT 28.5* 26.3* 22.2* 23.9* 23.2*    228 232 242 230   MCV 77* 81 80 81 80     HEME/ENDO:  Recent Labs     09/13/24  1153 09/11/24  1103 09/05/24  1050 12/25/23  0500 12/10/22  0402   TSH 62.00* 63.00* 83.00*  --   --    HGBA1C  --   --   --  5.8* 5.4      CARDIAC:   Recent Labs     10/08/24  1020 09/11/24  2214 09/11/24 2052 08/17/24  1208 08/17/24  1056   TROPHS  --  3 4 5 6   *  --  288*  --  22   No results for input(s): \"CHOL\", \"LDLF\", \"HDL\", \"TRIG\" in the last 44729 hours.    BNP   Date Value Ref Range Status   10/08/2024 383 (H) 0 - 99 pg/mL Final   09/11/2024 288 (H) 0 - 99 pg/mL Final   08/17/2024 22 0 - 99 pg/mL Final         DIAGNOSTIC STUDIES REVIEWED:        EKG:    Encounter Date: 09/11/24   ECG 12 lead   Result Value    Ventricular Rate 63    Atrial Rate 63    AR Interval 141    QRS Duration 99    QT Interval 447    QTC " Calculation(Bazett) 458    P Axis 39    R Axis 0    T Axis -1    QRS Count 10    Q Onset 253    T Offset 477    QTC Fredericia 454    Narrative    Sinus rhythm  Abnormal R-wave progression, early transition    See ED provider note for full interpretation and clinical correlation  Confirmed by Sherine Green ([id number]) on 9/15/2024 1:05:16 PM       ECHO 2023    1. Left ventricular systolic function is normal with an ejection fraction by   Biplane Method of Discs of 63 %.     2. There is normal diastolic function.     3. There is no pulmonary hypertension, estimated right ventricle systolic   pressure is 19 mmHg.     2021  1. Normal transthoracic echocardiogram.     2. Left ventricular systolic function is normal with an ejection fraction by   Biplane Method of Discs of 58 %.     3. No significant valvular abnormalities.     4. There is no pulmonary hypertension, estimated right ventricle systolic   pressure is 36 mmHg.     5. Turbulent flow in the distal pulmonary artery is suspicious for patent   ductus arteriosus.  Would correlate with clinical findings (continuous   murmur).  There is no right heart enlargement or evidence of elevated right   heart pressures to suggest that this is clinically significant.     6. Compared to the prior study from 2019, there is no significant interval   change.       CORONARY CT ANGIOGRAM    EXTRACARDIAC STRUCTURES: Mild cardiomegaly.  No pericardial effusion. Visualized aorta is normal in caliber. Pulmonary trunk measures approximately 3.1 cm in diameter.  No adenopathy.  Subtle bilateral multifocal ground-glass opacities.  No pleural effusion or pneumothorax. No concerning pulmonary nodules. Limited images of the upper abdomen demonstrate no acute abnormality. No aggressive osseous lesions.     IMPRESSION:     Subtle multifocal bilateral ground-glass opacities versus mosaic attenuation.  Mild pulmonary edema or air trapping related to small airways disease should be considered.   Viral or atypical pneumonia cannot be excluded.      Dilated pulmonary trunk suggesting elevated pulmonary artery pressures.     1. Total Agatston score of 16   2. Normal origin of the coronary arteries with no evidence of anomalous   coronaries.   3. Left Main Coronary Artery is normal without atherosclerotic disease.   4. Left Anterior Descending Artery has minimal luminal irregularities   5. Circumflex Artery has minimal luminal irregularities.   6. Right Coronary Artery has minimal luminal irregularities       PET 2024  1. Non-diagnostic pharmacologic stress electrocardiogram secondary to   resting electrocardiographic abnormalities.     2. Suggestion of a medium, moderate to severe fixed defect throughout the   inferior wall. However, inferior attenuation artifact is present and most   prominent on stress images, where free pertechnetate appears to be in the   stomach.  There may be no true fixed defect, as there is normal LVEF and wall   motion. Regardless, no findings of inducible ischemia.           ASSESSMENT AND PLAN:   Patient Active Problem List   Diagnosis    Atypical chest pain    Hyperlipidemia    Bilateral lower extremity edema    Iron deficiency anemia due to chronic blood loss    Shortness of breath       Possible heart failure she has edema dyspnea and elevated BNP making heart failure likely but need to obtain repeat TTE to stage systolic vs diastolic, for now we will add MRA to her regimen and repeat labs in a few weeks at same time as echocardiogram    Nonobstructive coronary artery disease she has numerous admissions for chest pain these appear to be in the setting of stimulant use I do not see any severe elevation in cardiac troponin but her substantial increase in BNP does raise the concern that she has suffered an embolic or vasoconstrictive event causing some systolic dysfunction.  That being said she has a coronary CTA which does not indicate substantial traditional coronary artery  disease I recommended discontinuing the aspirin for now given her transfusion dependent anemia although she should continue the statin particularly with her strong family history.      If her EF is dropped compared to prior we should consider a coronary angiogram but cannot really pursue that until we have dealt with her anemia and the possibility that she can take dual antiplatelet therapy    Severe iron deficiency anemia unclear etiology she denies having heavy periods no history of intestinal surgery I would favor at least resuming oral iron supplementation but she is somewhat resistant to this idea I did refer back to hematology today for further consideration of iron repletion    Hypothyroidism she reports he is currently taking thyroid replacement dose was recently increased would probably benefit from eventual loop establishment with endocrinology    Polysubstance abuse she reports cessation of stimulants but has started smoking, we discussed cessation she is considering nicotine replacement      I discussed heart failure therapeutic options, including: medical therapy, salt and fluid restriction, the need to monitor BP and weight, exercise and weight loss, smoking cessation, alcohol and drug cessation, need to control hypertension, follow up and medication changes.      I spent  50 minutes coordinating care, reviewing EKG/outside testing/labs and discussing options and adjusting medications.     I discussed with patient and family    I spent >50 percent of the time counseling the patient and discussing the diagnosis, general prognosis and plan of care with the patient       Orders placed during the encounter:   Orders Placed This Encounter   Procedures    B-Type Natriuretic Peptide    Basic metabolic panel    CBC    Referral to Hematology and Oncology    Transthoracic echo (TTE) complete      Followup Appts:  Future Appointments   Date Time Provider Department Center   1/20/2025 11:00 AM Shane Dean MD  [id number] [place]       Shane Bennett IV, MD  Heart Failure, Heart Transplant and  Mechanical Circulatory Support

## 2024-10-08 ENCOUNTER — HOSPITAL ENCOUNTER (OUTPATIENT)
Dept: CARDIOLOGY | Facility: HOSPITAL | Age: 44
Discharge: HOME | End: 2024-10-08
Payer: COMMERCIAL

## 2024-10-08 DIAGNOSIS — R60.0 BILATERAL LOWER EXTREMITY EDEMA: ICD-10-CM

## 2024-10-08 DIAGNOSIS — R06.00 DYSPNEA, UNSPECIFIED: ICD-10-CM

## 2024-10-08 DIAGNOSIS — D50.0 IRON DEFICIENCY ANEMIA DUE TO CHRONIC BLOOD LOSS: ICD-10-CM

## 2024-10-08 DIAGNOSIS — R06.02 SHORTNESS OF BREATH: ICD-10-CM

## 2024-10-08 DIAGNOSIS — R07.89 ATYPICAL CHEST PAIN: ICD-10-CM

## 2024-10-08 PROBLEM — E78.5 HYPERLIPIDEMIA: Status: ACTIVE | Noted: 2024-10-08

## 2024-10-08 LAB
ANION GAP SERPL CALC-SCNC: 10 MMOL/L (ref 10–20)
BNP SERPL-MCNC: 383 PG/ML (ref 0–99)
BUN SERPL-MCNC: 11 MG/DL (ref 6–23)
CALCIUM SERPL-MCNC: 8.8 MG/DL (ref 8.6–10.3)
CHLORIDE SERPL-SCNC: 103 MMOL/L (ref 98–107)
CO2 SERPL-SCNC: 29 MMOL/L (ref 21–32)
CREAT SERPL-MCNC: 0.72 MG/DL (ref 0.5–1.05)
EGFRCR SERPLBLD CKD-EPI 2021: >90 ML/MIN/1.73M*2
ERYTHROCYTE [DISTWIDTH] IN BLOOD BY AUTOMATED COUNT: 18.5 % (ref 11.5–14.5)
GLUCOSE SERPL-MCNC: 69 MG/DL (ref 74–99)
HCT VFR BLD AUTO: 28.5 % (ref 36–46)
HGB BLD-MCNC: 8.2 G/DL (ref 12–16)
IRON SATN MFR SERPL: ABNORMAL %
IRON SERPL-MCNC: 25 UG/DL (ref 35–150)
MCH RBC QN AUTO: 22.1 PG (ref 26–34)
MCHC RBC AUTO-ENTMCNC: 28.8 G/DL (ref 32–36)
MCV RBC AUTO: 77 FL (ref 80–100)
NRBC BLD-RTO: 0 /100 WBCS (ref 0–0)
PLATELET # BLD AUTO: 285 X10*3/UL (ref 150–450)
POTASSIUM SERPL-SCNC: 4.2 MMOL/L (ref 3.5–5.3)
RBC # BLD AUTO: 3.71 X10*6/UL (ref 4–5.2)
SODIUM SERPL-SCNC: 138 MMOL/L (ref 136–145)
TIBC SERPL-MCNC: ABNORMAL UG/DL
UIBC SERPL-MCNC: >450 UG/DL (ref 110–370)
WBC # BLD AUTO: 5.3 X10*3/UL (ref 4.4–11.3)

## 2024-10-08 PROCEDURE — 85027 COMPLETE CBC AUTOMATED: CPT | Performed by: INTERNAL MEDICINE

## 2024-10-08 PROCEDURE — 83550 IRON BINDING TEST: CPT | Performed by: INTERNAL MEDICINE

## 2024-10-08 PROCEDURE — 80048 BASIC METABOLIC PNL TOTAL CA: CPT | Performed by: INTERNAL MEDICINE

## 2024-10-08 PROCEDURE — 93306 TTE W/DOPPLER COMPLETE: CPT | Performed by: INTERNAL MEDICINE

## 2024-10-08 PROCEDURE — 93306 TTE W/DOPPLER COMPLETE: CPT

## 2024-10-08 PROCEDURE — 36415 COLL VENOUS BLD VENIPUNCTURE: CPT | Performed by: INTERNAL MEDICINE

## 2024-10-08 PROCEDURE — 83880 ASSAY OF NATRIURETIC PEPTIDE: CPT | Performed by: INTERNAL MEDICINE

## 2024-10-09 ENCOUNTER — APPOINTMENT (OUTPATIENT)
Dept: CARDIOLOGY | Facility: HOSPITAL | Age: 44
End: 2024-10-09
Payer: COMMERCIAL

## 2024-10-09 LAB
AORTIC VALVE PEAK VELOCITY: 1.57 M/S
AV PEAK GRADIENT: 9.9 MMHG
AVA (PEAK VEL): 2.76 CM2
EJECTION FRACTION APICAL 4 CHAMBER: 67
EJECTION FRACTION: 65 %
LEFT ATRIUM VOLUME AREA LENGTH INDEX BSA: 52.3 ML/M2
LEFT VENTRICLE INTERNAL DIMENSION DIASTOLE: 5.1 CM (ref 3.5–6)
LEFT VENTRICULAR OUTFLOW TRACT DIAMETER: 2.2 CM
LV EJECTION FRACTION BIPLANE: 65 %
MITRAL VALVE E/A RATIO: 1.72
MITRAL VALVE E/E' RATIO: 8
RIGHT VENTRICLE FREE WALL PEAK S': 13.3 CM/S
RIGHT VENTRICLE PEAK SYSTOLIC PRESSURE: 29.6 MMHG
TRICUSPID ANNULAR PLANE SYSTOLIC EXCURSION: 3.1 CM

## 2024-10-16 ENCOUNTER — TELEPHONE (OUTPATIENT)
Dept: HEMATOLOGY/ONCOLOGY | Facility: CLINIC | Age: 44
End: 2024-10-16
Payer: COMMERCIAL

## 2024-10-16 NOTE — TELEPHONE ENCOUNTER
Spoke with patient she is agreeable to visit on 11/7 1200    Dr Dowd to place lab orders.  [facility]     We reviewed that she may need IV iron infusions   She has had in the past.

## 2024-10-17 NOTE — TELEPHONE ENCOUNTER
Attempted to call patient x 2 left generic message on unidentifiable VM that no labs needed prior to appt in November.  Please call back with questions.

## 2024-10-17 NOTE — TELEPHONE ENCOUNTER
Physician is not requesting any additional labs at this time.    Patient can just come for appointment.

## 2024-10-19 ENCOUNTER — HOSPITAL ENCOUNTER (OUTPATIENT)
Dept: RADIOLOGY | Facility: HOSPITAL | Age: 44
Discharge: HOME | End: 2024-10-19
Payer: COMMERCIAL

## 2024-10-19 VITALS — WEIGHT: 225 LBS | BODY MASS INDEX: 32.21 KG/M2 | HEIGHT: 70 IN

## 2024-10-19 DIAGNOSIS — Z12.31 SCREENING MAMMOGRAM FOR BREAST CANCER: ICD-10-CM

## 2024-10-19 PROCEDURE — 77067 SCR MAMMO BI INCL CAD: CPT | Performed by: RADIOLOGY

## 2024-10-19 PROCEDURE — 77063 BREAST TOMOSYNTHESIS BI: CPT | Performed by: RADIOLOGY

## 2024-10-19 PROCEDURE — 77067 SCR MAMMO BI INCL CAD: CPT

## 2024-11-07 ENCOUNTER — LAB (OUTPATIENT)
Dept: LAB | Facility: CLINIC | Age: 44
End: 2024-11-07
Payer: COMMERCIAL

## 2024-11-07 ENCOUNTER — OFFICE VISIT (OUTPATIENT)
Dept: HEMATOLOGY/ONCOLOGY | Facility: CLINIC | Age: 44
End: 2024-11-07
Payer: COMMERCIAL

## 2024-11-07 VITALS
OXYGEN SATURATION: 97 % | WEIGHT: 223.77 LBS | TEMPERATURE: 97.7 F | BODY MASS INDEX: 33.91 KG/M2 | HEIGHT: 68 IN | SYSTOLIC BLOOD PRESSURE: 107 MMHG | DIASTOLIC BLOOD PRESSURE: 64 MMHG | RESPIRATION RATE: 16 BRPM | HEART RATE: 54 BPM

## 2024-11-07 DIAGNOSIS — R60.0 BILATERAL LOWER EXTREMITY EDEMA: ICD-10-CM

## 2024-11-07 DIAGNOSIS — R06.02 SHORTNESS OF BREATH: ICD-10-CM

## 2024-11-07 DIAGNOSIS — D50.0 IRON DEFICIENCY ANEMIA DUE TO CHRONIC BLOOD LOSS: ICD-10-CM

## 2024-11-07 DIAGNOSIS — E78.5 HYPERLIPIDEMIA, UNSPECIFIED HYPERLIPIDEMIA TYPE: ICD-10-CM

## 2024-11-07 LAB
BASOPHILS # BLD AUTO: 0.03 X10*3/UL (ref 0–0.1)
BASOPHILS NFR BLD AUTO: 0.5 %
EOSINOPHIL # BLD AUTO: 0.06 X10*3/UL (ref 0–0.7)
EOSINOPHIL NFR BLD AUTO: 1 %
ERYTHROCYTE [DISTWIDTH] IN BLOOD BY AUTOMATED COUNT: 20.9 % (ref 11.5–14.5)
HCT VFR BLD AUTO: 32.5 % (ref 36–46)
HGB BLD-MCNC: 9.6 G/DL (ref 12–16)
IMM GRANULOCYTES # BLD AUTO: 0 X10*3/UL (ref 0–0.7)
IMM GRANULOCYTES NFR BLD AUTO: 0 % (ref 0–0.9)
LYMPHOCYTES # BLD AUTO: 1.06 X10*3/UL (ref 1.2–4.8)
LYMPHOCYTES NFR BLD AUTO: 17.3 %
MCH RBC QN AUTO: 21.9 PG (ref 26–34)
MCHC RBC AUTO-ENTMCNC: 29.5 G/DL (ref 32–36)
MCV RBC AUTO: 74 FL (ref 80–100)
MONOCYTES # BLD AUTO: 0.38 X10*3/UL (ref 0.1–1)
MONOCYTES NFR BLD AUTO: 6.2 %
NEUTROPHILS # BLD AUTO: 4.6 X10*3/UL (ref 1.2–7.7)
NEUTROPHILS NFR BLD AUTO: 75 %
NRBC BLD-RTO: ABNORMAL /100{WBCS}
PLATELET # BLD AUTO: 282 X10*3/UL (ref 150–450)
RBC # BLD AUTO: 4.39 X10*6/UL (ref 4–5.2)
WBC # BLD AUTO: 6.1 X10*3/UL (ref 4.4–11.3)

## 2024-11-07 PROCEDURE — 86803 HEPATITIS C AB TEST: CPT | Performed by: INTERNAL MEDICINE

## 2024-11-07 PROCEDURE — 87389 HIV-1 AG W/HIV-1&-2 AB AG IA: CPT | Performed by: INTERNAL MEDICINE

## 2024-11-07 PROCEDURE — 85025 COMPLETE CBC W/AUTO DIFF WBC: CPT | Performed by: INTERNAL MEDICINE

## 2024-11-07 PROCEDURE — 82306 VITAMIN D 25 HYDROXY: CPT | Performed by: INTERNAL MEDICINE

## 2024-11-07 PROCEDURE — 80053 COMPREHEN METABOLIC PANEL: CPT | Performed by: INTERNAL MEDICINE

## 2024-11-07 PROCEDURE — 87522 HEPATITIS C REVRS TRNSCRPJ: CPT | Performed by: INTERNAL MEDICINE

## 2024-11-07 PROCEDURE — 99215 OFFICE O/P EST HI 40 MIN: CPT | Performed by: INTERNAL MEDICINE

## 2024-11-07 PROCEDURE — 36415 COLL VENOUS BLD VENIPUNCTURE: CPT | Performed by: INTERNAL MEDICINE

## 2024-11-07 PROCEDURE — 82746 ASSAY OF FOLIC ACID SERUM: CPT | Performed by: INTERNAL MEDICINE

## 2024-11-07 PROCEDURE — 82728 ASSAY OF FERRITIN: CPT | Performed by: INTERNAL MEDICINE

## 2024-11-07 PROCEDURE — 86706 HEP B SURFACE ANTIBODY: CPT | Performed by: INTERNAL MEDICINE

## 2024-11-07 PROCEDURE — 83615 LACTATE (LD) (LDH) ENZYME: CPT | Performed by: INTERNAL MEDICINE

## 2024-11-07 PROCEDURE — 84155 ASSAY OF PROTEIN SERUM: CPT | Performed by: INTERNAL MEDICINE

## 2024-11-07 PROCEDURE — 83540 ASSAY OF IRON: CPT | Performed by: INTERNAL MEDICINE

## 2024-11-07 PROCEDURE — 82607 VITAMIN B-12: CPT | Performed by: INTERNAL MEDICINE

## 2024-11-07 PROCEDURE — 83010 ASSAY OF HAPTOGLOBIN QUANT: CPT | Performed by: INTERNAL MEDICINE

## 2024-11-07 PROCEDURE — 83521 IG LIGHT CHAINS FREE EACH: CPT | Performed by: INTERNAL MEDICINE

## 2024-11-07 RX ORDER — ASPIRIN 81 MG/1
81 TABLET ORAL
COMMUNITY
Start: 2024-06-27 | End: 2025-06-27

## 2024-11-07 RX ORDER — METHADONE HYDROCHLORIDE 10 MG/5ML
150 SOLUTION ORAL
COMMUNITY

## 2024-11-07 RX ORDER — NALOXONE HYDROCHLORIDE 4 MG/.1ML
4 SPRAY NASAL
COMMUNITY
Start: 2024-01-31 | End: 2025-02-16

## 2024-11-07 RX ORDER — ALBUTEROL SULFATE 90 UG/1
2 INHALANT RESPIRATORY (INHALATION) EVERY 6 HOURS PRN
COMMUNITY
Start: 2024-02-01

## 2024-11-07 RX ORDER — IBUPROFEN 800 MG/1
TABLET ORAL
COMMUNITY
Start: 2024-05-02

## 2024-11-07 RX ORDER — ACETAMINOPHEN 500 MG
TABLET ORAL
COMMUNITY
Start: 2023-08-11

## 2024-11-07 ASSESSMENT — PAIN SCALES - GENERAL: PAINLEVEL_OUTOF10: 0-NO PAIN

## 2024-11-07 NOTE — PROGRESS NOTES
Labs today as ordered.  Patient will call to review results with RN.  Number provided.  US of Abdomen to be done at [facility].  Follow-up in January.  Call back instructions reviewed.  Patient verbalized understanding.

## 2024-11-07 NOTE — PROGRESS NOTES
Patient ID: Charito Daley is a 44 y.o. female.  Referring Physician: Shane Dean MD  [id number] [phone]  Primary Care Provider: GENERIC EXTERNAL DATA PROVIDER  Visit Type: Initial Visit  The patient was referred to me for further evaluation and management of severe anemia.  Subjective    HPI  The patient is a 44-year-old woman with a complex medical history was referred for further evaluation and management of severe anemia.  The patient has a history notable for severe chronic anemia previously diagnosed with iron deficiency was receiving intravenous iron while she lived in [facility].  The patient does not take oral iron because she believes it does not work.  The patient has a history of thyroidectomy for papillary thyroid cancer in 2016 was severely hypothyroid earlier this year after not consistently taking her medication.  She has a history of umbilical hernia repair but no intestinal surgery.  History of seizures now on Keppra no recent episodes.  Chest pain and edema were worked up with several negative CTPA and LE venous Dopplers.  The patient claims that she was severely anemic and was evaluated in [place] [facility] and received 1 unit of packed red blood cell transfusion.  She was then evaluated at [facility] on October 20, 2024 where her hemoglobin was down to 6.8 g/dL she received 1 infusion for Venofer as well as 1 unit of packed red blood cell transfusion.  The patient also claims to be positive for hepatitis C and has not started treatment and was told that until she is clean treatment will not be started.  In the past the patient has used intravenous fentanyl as well as methamphetamine.  Currently the patient lives with significant other in group home house for rehabilitation, and detox.    At interview on November 7, 2024 the patient narrated entire history.  She complained of easy fatigability but denied history of weight loss, fevers, night sweats, chest pain, shortness  "of breath, nausea, vomiting, hematemesis, melena, hematochezia and hematuria.    Past medical history:    Severe chronic anemia requiring iron and blood transfusions in the past, papillary thyroid cancer status post thyroidectomy in 2016, history of iron deficiency anemia, history of hepatitis C positivity.  Atypical chest pain,, Hyperlipidemia, hypertension, epilepsy  Past surgical history:    History of thyroidectomy and also radio active iodine.    Mammogram:    2024    Never had colonoscopy.    Family history:    Strong family history of coronary disease in father  of heart attack in his 50s Brother had a heart attack in 30s she has a twin sister with some sort of congenital heart disease who  as a complication of heart failure.    Personal history and social history:    44 years old, , lives in a long term house with significant other, has 4 children, not working, smokes cigarettes as well as smokeless tobacco denies alcohol abuse has used intravenous fentanyl in the past and also methamphetamines.  Review of Systems   All other systems reviewed and are negative.       Objective   BSA: 2.2 meters squared  /64   Pulse 54   Temp 36.5 °C (97.7 °F) (Temporal)   Resp 16   Ht (S) 1.726 m (5' 7.95\")   Wt 101 kg (223 lb 12.3 oz)   SpO2 97%   BMI 34.07 kg/m²      has a past medical history of History of thyroid cancer (2016).    She has no past medical history of Diabetes mellitus (Multi).   has no past surgical history on file.  Family History   Problem Relation Name Age of Onset    Breast cancer Mother      Thyroid cancer Father      Colon cancer Maternal Grandmother       Oncology History    No history exists.       Charito Thuy  reports that she has been smoking cigarettes. She uses smokeless tobacco.  She  reports that she does not currently use alcohol.  She  reports that she does not currently use drugs after having used the following drugs: Methamphetamines.    Physical " Exam  Constitutional:       Appearance: Normal appearance. She is obese.   HENT:      Head: Normocephalic and atraumatic.      Nose: Nose normal.      Mouth/Throat:      Mouth: Mucous membranes are moist.      Pharynx: Oropharynx is clear.   Eyes:      Extraocular Movements: Extraocular movements intact.      Conjunctiva/sclera: Conjunctivae normal.      Pupils: Pupils are equal, round, and reactive to light.   Cardiovascular:      Rate and Rhythm: Normal rate and regular rhythm.   Pulmonary:      Effort: Pulmonary effort is normal.      Breath sounds: Normal breath sounds.   Abdominal:      General: Abdomen is flat. Bowel sounds are normal.      Palpations: Abdomen is soft.   Musculoskeletal:         General: Normal range of motion.      Cervical back: Normal range of motion and neck supple.   Neurological:      General: No focal deficit present.      Mental Status: She is alert and oriented to person, place, and time. Mental status is at baseline.   Psychiatric:         Mood and Affect: Mood normal.         Behavior: Behavior normal.         Thought Content: Thought content normal.         Judgment: Judgment normal.     Looks older than stated age.    WBC   Date/Time Value Ref Range Status   11/07/2024 12:09 PM 6.1 4.4 - 11.3 x10*3/uL Final   10/08/2024 10:20 AM 5.3 4.4 - 11.3 x10*3/uL Final   09/16/2024 07:10 PM 5.5 4.4 - 11.3 x10*3/uL Final     nRBC   Date Value Ref Range Status   11/07/2024   Final     Comment:     Not Measured   10/08/2024 0.0 0.0 - 0.0 /100 WBCs Final   09/16/2024 0.0 0.0 - 0.0 /100 WBCs Final     RBC   Date Value Ref Range Status   11/07/2024 4.39 4.00 - 5.20 x10*6/uL Final   10/08/2024 3.71 (L) 4.00 - 5.20 x10*6/uL Final   09/16/2024 3.26 (L) 4.00 - 5.20 x10*6/uL Final     Hemoglobin   Date Value Ref Range Status   11/07/2024 9.6 (L) 12.0 - 16.0 g/dL Final   10/08/2024 8.2 (L) 12.0 - 16.0 g/dL Final   09/16/2024 8.0 (L) 12.0 - 16.0 g/dL Final     Hematocrit   Date Value Ref Range Status  "  11/07/2024 32.5 (L) 36.0 - 46.0 % Final   10/08/2024 28.5 (L) 36.0 - 46.0 % Final   09/16/2024 26.3 (L) 36.0 - 46.0 % Final     MCV   Date/Time Value Ref Range Status   11/07/2024 12:09 PM 74 (L) 80 - 100 fL Final   10/08/2024 10:20 AM 77 (L) 80 - 100 fL Final   09/16/2024 07:10 PM 81 80 - 100 fL Final     MCH   Date/Time Value Ref Range Status   11/07/2024 12:09 PM 21.9 (L) 26.0 - 34.0 pg Final   10/08/2024 10:20 AM 22.1 (L) 26.0 - 34.0 pg Final   09/16/2024 07:10 PM 24.5 (L) 26.0 - 34.0 pg Final     MCHC   Date/Time Value Ref Range Status   11/07/2024 12:09 PM 29.5 (L) 32.0 - 36.0 g/dL Final   10/08/2024 10:20 AM 28.8 (L) 32.0 - 36.0 g/dL Final   09/16/2024 07:10 PM 30.4 (L) 32.0 - 36.0 g/dL Final     RDW   Date/Time Value Ref Range Status   11/07/2024 12:09 PM 20.9 (H) 11.5 - 14.5 % Final   10/08/2024 10:20 AM 18.5 (H) 11.5 - 14.5 % Final   09/16/2024 07:10 PM 18.6 (H) 11.5 - 14.5 % Final     Platelets   Date/Time Value Ref Range Status   11/07/2024 12:09  150 - 450 x10*3/uL Final   10/08/2024 10:20  150 - 450 x10*3/uL Final   09/16/2024 07:10  150 - 450 x10*3/uL Final     No results found for: \"MPV\"  Neutrophils %   Date/Time Value Ref Range Status   11/07/2024 12:09 PM 75.0 40.0 - 80.0 % Final   09/16/2024 07:10 PM 60.5 40.0 - 80.0 % Final   09/13/2024 11:53 AM 54.5 40.0 - 80.0 % Final     Immature Granulocytes %, Automated   Date/Time Value Ref Range Status   11/07/2024 12:09 PM 0.0 0.0 - 0.9 % Final     Comment:     Immature Granulocyte Count (IG) includes promyelocytes, myelocytes and metamyelocytes but does not include bands. Percent differential counts (%) should be interpreted in the context of the absolute cell counts (cells/UL).   09/16/2024 07:10 PM 0.2 0.0 - 0.9 % Final     Comment:     Immature Granulocyte Count (IG) includes promyelocytes, myelocytes and metamyelocytes but does not include bands. Percent differential counts (%) should be interpreted in the context of the absolute " cell counts (cells/UL).   09/13/2024 11:53 AM 0.2 0.0 - 0.9 % Final     Comment:     Immature Granulocyte Count (IG) includes promyelocytes, myelocytes and metamyelocytes but does not include bands. Percent differential counts (%) should be interpreted in the context of the absolute cell counts (cells/UL).     Lymphocytes %   Date/Time Value Ref Range Status   11/07/2024 12:09 PM 17.3 13.0 - 44.0 % Final   09/16/2024 07:10 PM 24.7 13.0 - 44.0 % Final   09/13/2024 11:53 AM 33.3 13.0 - 44.0 % Final     Monocytes %   Date/Time Value Ref Range Status   11/07/2024 12:09 PM 6.2 2.0 - 10.0 % Final   09/16/2024 07:10 PM 10.8 2.0 - 10.0 % Final   09/13/2024 11:53 AM 8.4 2.0 - 10.0 % Final     Eosinophils %   Date/Time Value Ref Range Status   11/07/2024 12:09 PM 1.0 0.0 - 6.0 % Final   09/16/2024 07:10 PM 2.7 0.0 - 6.0 % Final   09/13/2024 11:53 AM 2.4 0.0 - 6.0 % Final     Basophils %   Date/Time Value Ref Range Status   11/07/2024 12:09 PM 0.5 0.0 - 2.0 % Final   09/16/2024 07:10 PM 1.1 0.0 - 2.0 % Final   09/13/2024 11:53 AM 1.2 0.0 - 2.0 % Final     Neutrophils Absolute   Date/Time Value Ref Range Status   11/07/2024 12:09 PM 4.60 1.20 - 7.70 x10*3/uL Final     Comment:     Percent differential counts (%) should be interpreted in the context of the absolute cell counts (cells/uL).   09/16/2024 07:10 PM 3.31 1.20 - 7.70 x10*3/uL Final     Comment:     Percent differential counts (%) should be interpreted in the context of the absolute cell counts (cells/uL).   09/13/2024 11:53 AM 2.26 1.20 - 7.70 x10*3/uL Final     Comment:     Percent differential counts (%) should be interpreted in the context of the absolute cell counts (cells/uL).     Immature Granulocytes Absolute, Automated   Date/Time Value Ref Range Status   11/07/2024 12:09 PM 0.00 0.00 - 0.70 x10*3/uL Final   09/16/2024 07:10 PM 0.01 0.00 - 0.70 x10*3/uL Final   09/13/2024 11:53 AM 0.01 0.00 - 0.70 x10*3/uL Final     Lymphocytes Absolute   Date/Time Value Ref  "Range Status   11/07/2024 12:09 PM 1.06 (L) 1.20 - 4.80 x10*3/uL Final   09/16/2024 07:10 PM 1.35 1.20 - 4.80 x10*3/uL Final   09/13/2024 11:53 AM 1.38 1.20 - 4.80 x10*3/uL Final     Monocytes Absolute   Date/Time Value Ref Range Status   11/07/2024 12:09 PM 0.38 0.10 - 1.00 x10*3/uL Final   09/16/2024 07:10 PM 0.59 0.10 - 1.00 x10*3/uL Final   09/13/2024 11:53 AM 0.35 0.10 - 1.00 x10*3/uL Final     Eosinophils Absolute   Date/Time Value Ref Range Status   11/07/2024 12:09 PM 0.06 0.00 - 0.70 x10*3/uL Final   09/16/2024 07:10 PM 0.15 0.00 - 0.70 x10*3/uL Final   09/13/2024 11:53 AM 0.10 0.00 - 0.70 x10*3/uL Final     Basophils Absolute   Date/Time Value Ref Range Status   11/07/2024 12:09 PM 0.03 0.00 - 0.10 x10*3/uL Final   09/16/2024 07:10 PM 0.06 0.00 - 0.10 x10*3/uL Final   09/13/2024 11:53 AM 0.05 0.00 - 0.10 x10*3/uL Final       No components found for: \"PT\"  No results found for: \"APTT\"    Assessment/Plan    The patient is a 44-year-old woman referred to me for further evaluation and management of severe chronic anemia.  Evaluated on November 7, 2024.Severe chronic anemia requiring iron and blood transfusions in the past, papillary thyroid cancer status post thyroidectomy in 2016, history of iron deficiency anemia, history of hepatitis C positivity.  Atypical chest pain,, Hyperlipidemia, hypertension, epilepsy.    Physical examination revealed obesity.  I had a detailed discussion with the patient explained to her it would be prudent to evaluate further with iron indicis, vitamin B12, folate, TSH levels, haptoglobin, hepatitis B, C and HIV serology and an ultrasound of abdomen.  Depending on what we find we will make further recommendations.    Thank you for allowing me to participate in care of your patient if you have any questions please feel free to call me.       Diagnoses and all orders for this visit:  Iron deficiency anemia due to chronic blood loss  -     Referral to Hematology and Oncology  -     " CBC and Auto Differential; Future  -     Comprehensive Metabolic Panel; Future  -     Ferritin; Future  -     Folate; Future  -     Haptoglobin; Future  -     Hemoglobin Identification with Path Review; Future  -     Iron and TIBC; Future  -     Lactate Dehydrogenase; Future  -     Vitamin B12; Future  -     Vitamin D 25-Hydroxy,Total (for eval of Vitamin D levels); Future  -     Halley/Lambda Free Light Chain, Serum; Future  -     Serum Protein Electrophoresis + Immunofixation; Future  -     Hepatitis B Surface Antibody; Future  -     Hepatitis C Antibody; Future  -     US abdomen complete; Future  -     Clinic Appointment Request; Future  -     HIV 1/2 Antigen/Antibody Screen with Reflex to Confirmation; Future  Bilateral lower extremity edema  Hyperlipidemia, unspecified hyperlipidemia type  Shortness of breath         Sumit Dowd MD

## 2024-11-07 NOTE — PATIENT INSTRUCTIONS
Ultrasound of abdomen at [facility] today as ordered  Call for lab results [phone]  Follow-up in January

## 2024-11-08 DIAGNOSIS — D50.0 IRON DEFICIENCY ANEMIA DUE TO CHRONIC BLOOD LOSS: Primary | ICD-10-CM

## 2024-11-08 LAB
25(OH)D3 SERPL-MCNC: 43 NG/ML (ref 30–100)
ALBUMIN SERPL BCP-MCNC: 3.6 G/DL (ref 3.4–5)
ALP SERPL-CCNC: 50 U/L (ref 33–110)
ALT SERPL W P-5'-P-CCNC: 35 U/L (ref 7–45)
ANION GAP SERPL CALC-SCNC: 12 MMOL/L (ref 10–20)
AST SERPL W P-5'-P-CCNC: 42 U/L (ref 9–39)
BILIRUB SERPL-MCNC: 0.7 MG/DL (ref 0–1.2)
BUN SERPL-MCNC: 12 MG/DL (ref 6–23)
CALCIUM SERPL-MCNC: 8.5 MG/DL (ref 8.6–10.6)
CHLORIDE SERPL-SCNC: 103 MMOL/L (ref 98–107)
CO2 SERPL-SCNC: 26 MMOL/L (ref 21–32)
CREAT SERPL-MCNC: 0.62 MG/DL (ref 0.5–1.05)
EGFRCR SERPLBLD CKD-EPI 2021: >90 ML/MIN/1.73M*2
FERRITIN SERPL-MCNC: 43 NG/ML (ref 8–150)
FOLATE SERPL-MCNC: >24 NG/ML
GLUCOSE SERPL-MCNC: 79 MG/DL (ref 74–99)
HAPTOGLOB SERPL NEPH-MCNC: 79 MG/DL (ref 30–200)
HBV SURFACE AB SER-ACNC: 29.7 MIU/ML
HCV AB SER QL: REACTIVE
HCV RNA SERPL NAA+PROBE-ACNC: ABNORMAL IU/ML
HCV RNA SERPL NAA+PROBE-ACNC: DETECTED K[IU]/ML
HCV RNA SERPL NAA+PROBE-LOG IU: 5.6 LOG IU/ML
HIV 1+2 AB+HIV1 P24 AG SERPL QL IA: NONREACTIVE
IRON SATN MFR SERPL: 8 % (ref 25–45)
IRON SERPL-MCNC: 38 UG/DL (ref 35–150)
KAPPA LC SERPL-MCNC: 5.58 MG/DL (ref 0.33–1.94)
KAPPA LC/LAMBDA SER: 1.55 {RATIO} (ref 0.26–1.65)
LAMBDA LC SERPL-MCNC: 3.59 MG/DL (ref 0.57–2.63)
LDH SERPL L TO P-CCNC: 269 U/L (ref 84–246)
POTASSIUM SERPL-SCNC: 4.5 MMOL/L (ref 3.5–5.3)
PROT SERPL-MCNC: 7.1 G/DL (ref 6.4–8.2)
PROT SERPL-MCNC: 7.1 G/DL (ref 6.4–8.2)
SODIUM SERPL-SCNC: 136 MMOL/L (ref 136–145)
TIBC SERPL-MCNC: 479 UG/DL (ref 240–445)
UIBC SERPL-MCNC: 441 UG/DL (ref 110–370)
VIT B12 SERPL-MCNC: 435 PG/ML (ref 211–911)

## 2024-11-08 RX ORDER — DIPHENHYDRAMINE HYDROCHLORIDE 50 MG/ML
50 INJECTION INTRAMUSCULAR; INTRAVENOUS AS NEEDED
OUTPATIENT
Start: 2024-11-15

## 2024-11-08 RX ORDER — EPINEPHRINE 0.3 MG/.3ML
0.3 INJECTION SUBCUTANEOUS EVERY 5 MIN PRN
OUTPATIENT
Start: 2024-11-15

## 2024-11-08 RX ORDER — FAMOTIDINE 10 MG/ML
20 INJECTION INTRAVENOUS ONCE AS NEEDED
OUTPATIENT
Start: 2024-11-15

## 2024-11-08 RX ORDER — HEPARIN SODIUM,PORCINE/PF 10 UNIT/ML
50 SYRINGE (ML) INTRAVENOUS AS NEEDED
OUTPATIENT
Start: 2024-11-08

## 2024-11-08 RX ORDER — HEPARIN 100 UNIT/ML
500 SYRINGE INTRAVENOUS AS NEEDED
OUTPATIENT
Start: 2024-11-08

## 2024-11-08 RX ORDER — ALBUTEROL SULFATE 0.83 MG/ML
3 SOLUTION RESPIRATORY (INHALATION) AS NEEDED
OUTPATIENT
Start: 2024-11-15

## 2024-11-12 LAB
ALBUMIN: 3.5 G/DL (ref 3.4–5)
ALPHA 1 GLOBULIN: 0.3 G/DL (ref 0.2–0.6)
ALPHA 2 GLOBULIN: 0.7 G/DL (ref 0.4–1.1)
BETA GLOBULIN: 0.8 G/DL (ref 0.5–1.2)
GAMMA GLOBULIN: 1.8 G/DL (ref 0.5–1.4)
HEMOGLOBIN A2: 2.4 % (ref 2–3.5)
HEMOGLOBIN A: 97.3 % (ref 95.8–98)
HEMOGLOBIN F: 0.3 % (ref 0–2)
HEMOGLOBIN IDENTIFICATION INTERPRETATION: NORMAL
IMMUNOFIXATION COMMENT: ABNORMAL
PATH REVIEW - SERUM IMMUNOFIXATION: ABNORMAL
PATH REVIEW-HGB IDENTIFICATION: NORMAL
PATH REVIEW-SERUM PROTEIN ELECTROPHORESIS: ABNORMAL
PROTEIN ELECTROPHORESIS COMMENT: ABNORMAL

## 2024-11-13 ENCOUNTER — HOSPITAL ENCOUNTER (OUTPATIENT)
Dept: RADIOLOGY | Facility: CLINIC | Age: 44
Discharge: HOME | End: 2024-11-13
Payer: COMMERCIAL

## 2024-11-13 DIAGNOSIS — D50.0 IRON DEFICIENCY ANEMIA DUE TO CHRONIC BLOOD LOSS: ICD-10-CM

## 2024-11-13 PROCEDURE — 76700 US EXAM ABDOM COMPLETE: CPT | Performed by: RADIOLOGY

## 2024-11-13 PROCEDURE — 76700 US EXAM ABDOM COMPLETE: CPT

## 2024-11-15 DIAGNOSIS — R89.9 ABNORMAL LABORATORY TEST RESULT: ICD-10-CM

## 2024-11-19 ENCOUNTER — SOCIAL WORK (OUTPATIENT)
Dept: HEMATOLOGY/ONCOLOGY | Facility: CLINIC | Age: 44
End: 2024-11-19

## 2024-11-19 ENCOUNTER — TELEPHONE (OUTPATIENT)
Dept: HEMATOLOGY/ONCOLOGY | Facility: CLINIC | Age: 44
End: 2024-11-19
Payer: COMMERCIAL

## 2024-11-19 ENCOUNTER — INFUSION (OUTPATIENT)
Dept: HEMATOLOGY/ONCOLOGY | Facility: CLINIC | Age: 44
End: 2024-11-19
Payer: COMMERCIAL

## 2024-11-19 VITALS
BODY MASS INDEX: 34.21 KG/M2 | TEMPERATURE: 96.8 F | HEIGHT: 68 IN | WEIGHT: 225.75 LBS | DIASTOLIC BLOOD PRESSURE: 65 MMHG | HEART RATE: 65 BPM | SYSTOLIC BLOOD PRESSURE: 95 MMHG | RESPIRATION RATE: 14 BRPM | OXYGEN SATURATION: 99 %

## 2024-11-19 DIAGNOSIS — D50.0 IRON DEFICIENCY ANEMIA DUE TO CHRONIC BLOOD LOSS: ICD-10-CM

## 2024-11-19 PROCEDURE — 96365 THER/PROPH/DIAG IV INF INIT: CPT | Mod: INF

## 2024-11-19 PROCEDURE — 2500000004 HC RX 250 GENERAL PHARMACY W/ HCPCS (ALT 636 FOR OP/ED): Mod: SE | Performed by: INTERNAL MEDICINE

## 2024-11-19 RX ORDER — ALBUTEROL SULFATE 0.83 MG/ML
3 SOLUTION RESPIRATORY (INHALATION) AS NEEDED
Status: DISCONTINUED | OUTPATIENT
Start: 2024-11-19 | End: 2024-11-19 | Stop reason: HOSPADM

## 2024-11-19 RX ORDER — EPINEPHRINE 0.3 MG/.3ML
0.3 INJECTION SUBCUTANEOUS EVERY 5 MIN PRN
Status: CANCELLED | OUTPATIENT
Start: 2024-11-23

## 2024-11-19 RX ORDER — FAMOTIDINE 10 MG/ML
20 INJECTION INTRAVENOUS ONCE AS NEEDED
Status: CANCELLED | OUTPATIENT
Start: 2024-11-23

## 2024-11-19 RX ORDER — DIPHENHYDRAMINE HYDROCHLORIDE 50 MG/ML
50 INJECTION INTRAMUSCULAR; INTRAVENOUS AS NEEDED
Status: CANCELLED | OUTPATIENT
Start: 2024-11-23

## 2024-11-19 RX ORDER — HEPARIN SODIUM,PORCINE/PF 10 UNIT/ML
50 SYRINGE (ML) INTRAVENOUS AS NEEDED
OUTPATIENT
Start: 2024-11-19

## 2024-11-19 RX ORDER — FAMOTIDINE 10 MG/ML
20 INJECTION INTRAVENOUS ONCE AS NEEDED
Status: DISCONTINUED | OUTPATIENT
Start: 2024-11-19 | End: 2024-11-19 | Stop reason: HOSPADM

## 2024-11-19 RX ORDER — HEPARIN SODIUM,PORCINE/PF 10 UNIT/ML
50 SYRINGE (ML) INTRAVENOUS AS NEEDED
Status: DISCONTINUED | OUTPATIENT
Start: 2024-11-19 | End: 2024-11-19 | Stop reason: HOSPADM

## 2024-11-19 RX ORDER — ALBUTEROL SULFATE 0.83 MG/ML
3 SOLUTION RESPIRATORY (INHALATION) AS NEEDED
Status: CANCELLED | OUTPATIENT
Start: 2024-11-23

## 2024-11-19 RX ORDER — HEPARIN 100 UNIT/ML
500 SYRINGE INTRAVENOUS AS NEEDED
OUTPATIENT
Start: 2024-11-19

## 2024-11-19 RX ORDER — HEPARIN 100 UNIT/ML
500 SYRINGE INTRAVENOUS AS NEEDED
Status: DISCONTINUED | OUTPATIENT
Start: 2024-11-19 | End: 2024-11-19 | Stop reason: HOSPADM

## 2024-11-19 RX ORDER — DIPHENHYDRAMINE HYDROCHLORIDE 50 MG/ML
50 INJECTION INTRAMUSCULAR; INTRAVENOUS AS NEEDED
Status: DISCONTINUED | OUTPATIENT
Start: 2024-11-19 | End: 2024-11-19 | Stop reason: HOSPADM

## 2024-11-19 RX ORDER — EPINEPHRINE 0.3 MG/.3ML
0.3 INJECTION SUBCUTANEOUS EVERY 5 MIN PRN
Status: DISCONTINUED | OUTPATIENT
Start: 2024-11-19 | End: 2024-11-19 | Stop reason: HOSPADM

## 2024-11-19 ASSESSMENT — PAIN SCALES - GENERAL: PAINLEVEL_OUTOF10: 0-NO PAIN

## 2024-11-19 NOTE — PROGRESS NOTES
Iron deficient animea -  Multiple ED visits-   papillary thyroid cancer status post thyroidectomy in 2016,   Discussed edu / diet+ venofer infusion today - patient has received iron in past and tolerated     extreme fatigue today -       Hard piv start - x1 right wrist has scar tissue over right ac- tolerated well     Benadryl allergy    Met with SW today    1030- venofer infusing 30 min - resting in chair     Vitals wnl after- patient sleeping heavily after transfusion- baseline     Last hgb 9.5 11/17/24 discussed low hgb and fatigue     Ambulated off unit - unsteady gait at times baseline will rtc Friday as scheduled

## 2024-11-19 NOTE — PROGRESS NOTES
The  met with this pt for the first time. Her significant other (Dave) was present with her.   The pt reports she comes in for Iron infusions.     The pt reports that she and her significant other both live in [facility] homes in the [facility]. The pt reports she will graduate from the home on December 31st and he will graduate shortly after.   The pt reports she has 4 children who are in the [place] area.   The pt reports she has past felony charges, but no evictions.     The pt and her significant other plan to get a place together in the [place] or [facility]. 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     The pt reports she could use information/resources for housing in these areas, and directions for applying for the [facility] government assistance for financial aid for college courses.     The SW printed many resources for the pt and put them in a folder for her to use. The SW will continue to follow up as needed. The pt was appreciative of assistance.

## 2024-11-19 NOTE — TELEPHONE ENCOUNTER
Call placed to patient and received an unidentified voicemail.  Message left for pt to return call to our office with contact information.     Wanting to inform pt that a referral has been sent for her to see Hepatologist Dr. Jseu Lopez per Dr. Dowd's request for Hepatitis C & Hepatitis B abnormal lab results.      Will also send a [place] message to patient to inform.

## 2024-11-22 ENCOUNTER — INFUSION (OUTPATIENT)
Dept: HEMATOLOGY/ONCOLOGY | Facility: CLINIC | Age: 44
End: 2024-11-22
Payer: COMMERCIAL

## 2024-11-22 VITALS
HEART RATE: 58 BPM | OXYGEN SATURATION: 99 % | DIASTOLIC BLOOD PRESSURE: 58 MMHG | WEIGHT: 229.17 LBS | RESPIRATION RATE: 14 BRPM | HEIGHT: 68 IN | BODY MASS INDEX: 34.73 KG/M2 | SYSTOLIC BLOOD PRESSURE: 90 MMHG | TEMPERATURE: 97.7 F

## 2024-11-22 DIAGNOSIS — D50.0 IRON DEFICIENCY ANEMIA DUE TO CHRONIC BLOOD LOSS: ICD-10-CM

## 2024-11-22 PROCEDURE — 96367 TX/PROPH/DG ADDL SEQ IV INF: CPT

## 2024-11-22 PROCEDURE — 96365 THER/PROPH/DIAG IV INF INIT: CPT | Mod: INF

## 2024-11-22 RX ORDER — DIPHENHYDRAMINE HYDROCHLORIDE 50 MG/ML
50 INJECTION INTRAMUSCULAR; INTRAVENOUS AS NEEDED
OUTPATIENT
Start: 2024-11-23

## 2024-11-22 RX ORDER — ALBUTEROL SULFATE 0.83 MG/ML
3 SOLUTION RESPIRATORY (INHALATION) AS NEEDED
OUTPATIENT
Start: 2024-11-23

## 2024-11-22 RX ORDER — FAMOTIDINE 10 MG/ML
20 INJECTION INTRAVENOUS ONCE AS NEEDED
Status: DISCONTINUED | OUTPATIENT
Start: 2024-11-22 | End: 2024-11-22 | Stop reason: HOSPADM

## 2024-11-22 RX ORDER — EPINEPHRINE 0.3 MG/.3ML
0.3 INJECTION SUBCUTANEOUS EVERY 5 MIN PRN
Status: DISCONTINUED | OUTPATIENT
Start: 2024-11-22 | End: 2024-11-22 | Stop reason: HOSPADM

## 2024-11-22 RX ORDER — FAMOTIDINE 10 MG/ML
20 INJECTION INTRAVENOUS ONCE AS NEEDED
OUTPATIENT
Start: 2024-11-23

## 2024-11-22 RX ORDER — ALBUTEROL SULFATE 0.83 MG/ML
3 SOLUTION RESPIRATORY (INHALATION) AS NEEDED
Status: DISCONTINUED | OUTPATIENT
Start: 2024-11-22 | End: 2024-11-22 | Stop reason: HOSPADM

## 2024-11-22 RX ORDER — EPINEPHRINE 0.3 MG/.3ML
0.3 INJECTION SUBCUTANEOUS EVERY 5 MIN PRN
OUTPATIENT
Start: 2024-11-23

## 2024-11-22 RX ORDER — DIPHENHYDRAMINE HYDROCHLORIDE 50 MG/ML
50 INJECTION INTRAMUSCULAR; INTRAVENOUS AS NEEDED
Status: DISCONTINUED | OUTPATIENT
Start: 2024-11-22 | End: 2024-11-22 | Stop reason: HOSPADM

## 2024-11-22 ASSESSMENT — PAIN SCALES - GENERAL: PAINLEVEL_OUTOF10: 0-NO PAIN

## 2024-11-25 ENCOUNTER — TELEPHONE (OUTPATIENT)
Dept: HEMATOLOGY/ONCOLOGY | Facility: CLINIC | Age: 44
End: 2024-11-25
Payer: COMMERCIAL

## 2024-11-25 NOTE — TELEPHONE ENCOUNTER
Pt returning my call.  Reports she received 2 doses of Venofer on 11/19/24 and 11/22/24.  Already scheduled for dose #3 on 11/27/24.  Informed I am watching her referral to Hepatology to make sure it gets scheduled.  Pt appreciative and denies further questions or needs and aware of how to reach office. Patient verbalizes understanding of plan of care via teachback method.

## 2024-11-27 ENCOUNTER — INFUSION (OUTPATIENT)
Dept: HEMATOLOGY/ONCOLOGY | Facility: CLINIC | Age: 44
End: 2024-11-27
Payer: COMMERCIAL

## 2024-11-27 VITALS
OXYGEN SATURATION: 98 % | DIASTOLIC BLOOD PRESSURE: 71 MMHG | WEIGHT: 231.59 LBS | TEMPERATURE: 98.4 F | SYSTOLIC BLOOD PRESSURE: 107 MMHG | BODY MASS INDEX: 35.26 KG/M2 | HEART RATE: 61 BPM | RESPIRATION RATE: 14 BRPM

## 2024-11-27 DIAGNOSIS — D50.0 IRON DEFICIENCY ANEMIA DUE TO CHRONIC BLOOD LOSS: ICD-10-CM

## 2024-11-27 PROCEDURE — 2500000004 HC RX 250 GENERAL PHARMACY W/ HCPCS (ALT 636 FOR OP/ED): Mod: SE | Performed by: INTERNAL MEDICINE

## 2024-11-27 PROCEDURE — 96365 THER/PROPH/DIAG IV INF INIT: CPT | Mod: INF

## 2024-11-27 RX ORDER — EPINEPHRINE 0.3 MG/.3ML
0.3 INJECTION SUBCUTANEOUS EVERY 5 MIN PRN
Status: DISCONTINUED | OUTPATIENT
Start: 2024-11-27 | End: 2024-11-27 | Stop reason: HOSPADM

## 2024-11-27 RX ORDER — METHYLPREDNISOLONE 4 MG/1
TABLET ORAL
COMMUNITY
Start: 2024-11-08

## 2024-11-27 RX ORDER — ALBUTEROL SULFATE 0.83 MG/ML
3 SOLUTION RESPIRATORY (INHALATION) AS NEEDED
OUTPATIENT
Start: 2024-11-30

## 2024-11-27 RX ORDER — FLUTICASONE PROPIONATE 44 UG/1
1 AEROSOL, METERED RESPIRATORY (INHALATION) 2 TIMES DAILY
COMMUNITY
Start: 2024-11-14 | End: 2025-01-13

## 2024-11-27 RX ORDER — FAMOTIDINE 10 MG/ML
20 INJECTION INTRAVENOUS ONCE AS NEEDED
OUTPATIENT
Start: 2024-11-30

## 2024-11-27 RX ORDER — FAMOTIDINE 10 MG/ML
20 INJECTION INTRAVENOUS ONCE AS NEEDED
Status: DISCONTINUED | OUTPATIENT
Start: 2024-11-27 | End: 2024-11-27 | Stop reason: HOSPADM

## 2024-11-27 RX ORDER — BENZONATATE 100 MG/1
CAPSULE ORAL
COMMUNITY
Start: 2024-11-11

## 2024-11-27 RX ORDER — EPINEPHRINE 0.3 MG/.3ML
0.3 INJECTION SUBCUTANEOUS EVERY 5 MIN PRN
OUTPATIENT
Start: 2024-11-30

## 2024-11-27 RX ORDER — PREDNISONE 20 MG/1
TABLET ORAL
COMMUNITY
Start: 2024-11-11

## 2024-11-27 RX ORDER — LEVOTHYROXINE SODIUM 112 UG/1
TABLET ORAL
COMMUNITY
Start: 2024-11-21

## 2024-11-27 RX ORDER — DIPHENHYDRAMINE HYDROCHLORIDE 50 MG/ML
50 INJECTION INTRAMUSCULAR; INTRAVENOUS AS NEEDED
OUTPATIENT
Start: 2024-11-30

## 2024-11-27 RX ORDER — DIPHENHYDRAMINE HYDROCHLORIDE 50 MG/ML
50 INJECTION INTRAMUSCULAR; INTRAVENOUS AS NEEDED
Status: DISCONTINUED | OUTPATIENT
Start: 2024-11-27 | End: 2024-11-27 | Stop reason: HOSPADM

## 2024-11-27 RX ORDER — ALBUTEROL SULFATE 0.83 MG/ML
3 SOLUTION RESPIRATORY (INHALATION) AS NEEDED
Status: DISCONTINUED | OUTPATIENT
Start: 2024-11-27 | End: 2024-11-27 | Stop reason: HOSPADM

## 2024-11-27 ASSESSMENT — PAIN SCALES - GENERAL: PAINLEVEL_OUTOF10: 0-NO PAIN

## 2024-11-27 NOTE — PROGRESS NOTES
Patient here today for venofer- piv x3 started- 30 minutes into venofer patient had discomfort at site stopped infusion good blood return - patient stated did taste saline no sign of phlebitis/extravasation - good flush and blood return- adjusted catheter- increased discomfort discontinued piv and restarted -     Pt verbalized understanding will discuss with MD team to see if patient would need to come back for another venofer dose - to complete cycle did receive about 1/3 of venofer today- encouraged hydration-

## 2024-11-29 ENCOUNTER — TELEPHONE (OUTPATIENT)
Dept: GASTROENTEROLOGY | Facility: HOSPITAL | Age: 44
End: 2024-11-29
Payer: COMMERCIAL

## 2024-11-29 NOTE — PROGRESS NOTES
Pt scheduled for repeat last dose of Venofer on 12/6/24 @ 1:30, room 7 per charge nurse Minal.  Sqrrlt message sent to patient to inform of this new appt.      Scheduling order sent to Urgent Scheduling pool.

## 2024-12-06 ENCOUNTER — INFUSION (OUTPATIENT)
Dept: HEMATOLOGY/ONCOLOGY | Facility: CLINIC | Age: 44
End: 2024-12-06
Payer: COMMERCIAL

## 2024-12-06 VITALS
OXYGEN SATURATION: 99 % | DIASTOLIC BLOOD PRESSURE: 70 MMHG | BODY MASS INDEX: 35.63 KG/M2 | HEART RATE: 58 BPM | HEIGHT: 68 IN | WEIGHT: 235.12 LBS | SYSTOLIC BLOOD PRESSURE: 105 MMHG | RESPIRATION RATE: 16 BRPM | TEMPERATURE: 96.8 F

## 2024-12-06 DIAGNOSIS — D50.0 IRON DEFICIENCY ANEMIA DUE TO CHRONIC BLOOD LOSS: Primary | ICD-10-CM

## 2024-12-06 DIAGNOSIS — D50.0 IRON DEFICIENCY ANEMIA DUE TO CHRONIC BLOOD LOSS: ICD-10-CM

## 2024-12-06 PROCEDURE — 96365 THER/PROPH/DIAG IV INF INIT: CPT | Mod: INF

## 2024-12-06 PROCEDURE — 2500000004 HC RX 250 GENERAL PHARMACY W/ HCPCS (ALT 636 FOR OP/ED): Mod: SE | Performed by: INTERNAL MEDICINE

## 2024-12-06 RX ORDER — HEPARIN SODIUM,PORCINE/PF 10 UNIT/ML
50 SYRINGE (ML) INTRAVENOUS AS NEEDED
OUTPATIENT
Start: 2024-12-06

## 2024-12-06 RX ORDER — HEPARIN 100 UNIT/ML
500 SYRINGE INTRAVENOUS AS NEEDED
OUTPATIENT
Start: 2024-12-06

## 2024-12-06 RX ORDER — EPINEPHRINE 0.3 MG/.3ML
0.3 INJECTION SUBCUTANEOUS EVERY 5 MIN PRN
OUTPATIENT
Start: 2024-12-06

## 2024-12-06 RX ORDER — FAMOTIDINE 10 MG/ML
20 INJECTION INTRAVENOUS ONCE AS NEEDED
OUTPATIENT
Start: 2024-12-06

## 2024-12-06 RX ORDER — ALBUTEROL SULFATE 0.83 MG/ML
3 SOLUTION RESPIRATORY (INHALATION) AS NEEDED
OUTPATIENT
Start: 2024-12-06

## 2024-12-06 RX ORDER — DIPHENHYDRAMINE HYDROCHLORIDE 50 MG/ML
50 INJECTION INTRAMUSCULAR; INTRAVENOUS AS NEEDED
OUTPATIENT
Start: 2024-12-06

## 2024-12-06 ASSESSMENT — PAIN SCALES - GENERAL: PAINLEVEL_OUTOF10: 0-NO PAIN

## 2024-12-06 NOTE — PROGRESS NOTES
Patient is here today for Venofer infusion -   b/h/ lung sounds not auscultated  Patient tolerated infusion well. Remained asymptomatic during infusion and post 30 min observation period.  Call back instructions reviewed.    Patient verbalizes understanding of plan of care.  Ambulated off unit without difficulty, steady gait.  Accompanied by family.

## 2024-12-10 ENCOUNTER — APPOINTMENT (OUTPATIENT)
Dept: HEMATOLOGY/ONCOLOGY | Facility: HOSPITAL | Age: 44
End: 2024-12-10
Payer: COMMERCIAL

## 2025-01-03 ENCOUNTER — PATIENT MESSAGE (OUTPATIENT)
Dept: GASTROENTEROLOGY | Facility: CLINIC | Age: 45
End: 2025-01-03

## 2025-01-03 ENCOUNTER — LAB (OUTPATIENT)
Dept: LAB | Facility: LAB | Age: 45
End: 2025-01-03
Payer: COMMERCIAL

## 2025-01-03 ENCOUNTER — SPECIALTY PHARMACY (OUTPATIENT)
Dept: PHARMACY | Facility: CLINIC | Age: 45
End: 2025-01-03

## 2025-01-03 ENCOUNTER — OFFICE VISIT (OUTPATIENT)
Dept: GASTROENTEROLOGY | Facility: CLINIC | Age: 45
End: 2025-01-03
Payer: COMMERCIAL

## 2025-01-03 VITALS
HEIGHT: 68 IN | HEART RATE: 81 BPM | SYSTOLIC BLOOD PRESSURE: 102 MMHG | WEIGHT: 242 LBS | BODY MASS INDEX: 36.68 KG/M2 | TEMPERATURE: 97.7 F | DIASTOLIC BLOOD PRESSURE: 70 MMHG

## 2025-01-03 DIAGNOSIS — B18.2 CHRONIC HEPATITIS C WITHOUT HEPATIC COMA (MULTI): ICD-10-CM

## 2025-01-03 DIAGNOSIS — Z20.1 HISTORY OF TUBERCULOSIS EXPOSURE: ICD-10-CM

## 2025-01-03 DIAGNOSIS — B18.2 CHRONIC HEPATITIS C WITHOUT HEPATIC COMA (MULTI): Primary | ICD-10-CM

## 2025-01-03 DIAGNOSIS — K59.03 DRUG-INDUCED CONSTIPATION: ICD-10-CM

## 2025-01-03 LAB
ALBUMIN SERPL BCP-MCNC: 3.9 G/DL (ref 3.4–5)
ALP SERPL-CCNC: 55 U/L (ref 33–110)
ALT SERPL W P-5'-P-CCNC: 86 U/L (ref 7–45)
ANION GAP SERPL CALC-SCNC: 15 MMOL/L (ref 10–20)
AST SERPL W P-5'-P-CCNC: 65 U/L (ref 9–39)
BASOPHILS # BLD AUTO: 0.05 X10*3/UL (ref 0–0.1)
BASOPHILS NFR BLD AUTO: 0.9 %
BILIRUB SERPL-MCNC: 0.5 MG/DL (ref 0–1.2)
BUN SERPL-MCNC: 20 MG/DL (ref 6–23)
CALCIUM SERPL-MCNC: 9.2 MG/DL (ref 8.6–10.6)
CHLORIDE SERPL-SCNC: 103 MMOL/L (ref 98–107)
CO2 SERPL-SCNC: 25 MMOL/L (ref 21–32)
CREAT SERPL-MCNC: 0.86 MG/DL (ref 0.5–1.05)
EGFRCR SERPLBLD CKD-EPI 2021: 86 ML/MIN/1.73M*2
EOSINOPHIL # BLD AUTO: 0.12 X10*3/UL (ref 0–0.7)
EOSINOPHIL NFR BLD AUTO: 2.2 %
ERYTHROCYTE [DISTWIDTH] IN BLOOD BY AUTOMATED COUNT: 24.6 % (ref 11.5–14.5)
GLUCOSE SERPL-MCNC: 104 MG/DL (ref 74–99)
HAV AB SER QL IA: REACTIVE
HBV CORE AB SER QL: REACTIVE
HCT VFR BLD AUTO: 38.1 % (ref 36–46)
HGB BLD-MCNC: 11.7 G/DL (ref 12–16)
IMM GRANULOCYTES # BLD AUTO: 0.01 X10*3/UL (ref 0–0.7)
IMM GRANULOCYTES NFR BLD AUTO: 0.2 % (ref 0–0.9)
LYMPHOCYTES # BLD AUTO: 1.25 X10*3/UL (ref 1.2–4.8)
LYMPHOCYTES NFR BLD AUTO: 23.4 %
MCH RBC QN AUTO: 24.2 PG (ref 26–34)
MCHC RBC AUTO-ENTMCNC: 30.7 G/DL (ref 32–36)
MCV RBC AUTO: 79 FL (ref 80–100)
MONOCYTES # BLD AUTO: 0.43 X10*3/UL (ref 0.1–1)
MONOCYTES NFR BLD AUTO: 8 %
NEUTROPHILS # BLD AUTO: 3.49 X10*3/UL (ref 1.2–7.7)
NEUTROPHILS NFR BLD AUTO: 65.3 %
NRBC BLD-RTO: 0 /100 WBCS (ref 0–0)
OVALOCYTES BLD QL SMEAR: NORMAL
PLATELET # BLD AUTO: 232 X10*3/UL (ref 150–450)
POTASSIUM SERPL-SCNC: 4.5 MMOL/L (ref 3.5–5.3)
PROT SERPL-MCNC: 7.2 G/DL (ref 6.4–8.2)
RBC # BLD AUTO: 4.84 X10*6/UL (ref 4–5.2)
RBC MORPH BLD: NORMAL
SODIUM SERPL-SCNC: 138 MMOL/L (ref 136–145)
WBC # BLD AUTO: 5.4 X10*3/UL (ref 4.4–11.3)

## 2025-01-03 PROCEDURE — 86704 HEP B CORE ANTIBODY TOTAL: CPT

## 2025-01-03 PROCEDURE — 87902 NFCT AGT GNTYP ALYS HEP C: CPT

## 2025-01-03 PROCEDURE — 83883 ASSAY NEPHELOMETRY NOT SPEC: CPT

## 2025-01-03 PROCEDURE — 3008F BODY MASS INDEX DOCD: CPT | Performed by: NURSE PRACTITIONER

## 2025-01-03 PROCEDURE — 84460 ALANINE AMINO (ALT) (SGPT): CPT

## 2025-01-03 PROCEDURE — 99204 OFFICE O/P NEW MOD 45 MIN: CPT | Performed by: NURSE PRACTITIONER

## 2025-01-03 PROCEDURE — 86708 HEPATITIS A ANTIBODY: CPT

## 2025-01-03 PROCEDURE — 99214 OFFICE O/P EST MOD 30 MIN: CPT | Performed by: NURSE PRACTITIONER

## 2025-01-03 PROCEDURE — 87521 HEPATITIS C PROBE&RVRS TRNSC: CPT

## 2025-01-03 PROCEDURE — 85025 COMPLETE CBC W/AUTO DIFF WBC: CPT

## 2025-01-03 RX ORDER — POLYETHYLENE GLYCOL 3350 17 G/17G
17 POWDER, FOR SOLUTION ORAL DAILY
Qty: 30 PACKET | Refills: 11 | Status: SHIPPED | OUTPATIENT
Start: 2025-01-03 | End: 2026-01-03

## 2025-01-03 RX ORDER — MODAFINIL 100 MG/1
100 TABLET ORAL
COMMUNITY
Start: 2025-01-02 | End: 2025-03-03

## 2025-01-03 RX ORDER — GLECAPREVIR AND PIBRENTASVIR 40; 100 MG/1; MG/1
3 TABLET, FILM COATED ORAL DAILY
Qty: 84 TABLET | Refills: 1 | Status: SHIPPED | OUTPATIENT
Start: 2025-01-03

## 2025-01-03 ASSESSMENT — ENCOUNTER SYMPTOMS
AGITATION: 0
TROUBLE SWALLOWING: 0
CONFUSION: 0
COUGH: 0
DIFFICULTY URINATING: 0
SLEEP DISTURBANCE: 0
VOICE CHANGE: 0
BLOOD IN STOOL: 0
COLOR CHANGE: 0
WHEEZING: 0
JOINT SWELLING: 0
WEAKNESS: 0
SHORTNESS OF BREATH: 0
DIARRHEA: 0
DYSURIA: 0
NAUSEA: 0
PALPITATIONS: 0
FREQUENCY: 0
BRUISES/BLEEDS EASILY: 0
FEVER: 0
LIGHT-HEADEDNESS: 0
TREMORS: 0
DIZZINESS: 0
CONSTIPATION: 1
ABDOMINAL PAIN: 0
UNEXPECTED WEIGHT CHANGE: 0
HEMATURIA: 0
ABDOMINAL DISTENTION: 0
HEADACHES: 0
VOMITING: 0
NUMBNESS: 0
CHILLS: 0
APPETITE CHANGE: 0

## 2025-01-03 ASSESSMENT — PAIN SCALES - GENERAL: PAINLEVEL_OUTOF10: 4

## 2025-01-03 NOTE — PROGRESS NOTES
Patient ID: Charito Daley is a 44 y.o. female who presents for hepatitis C .    HPI    44 year old with history of HCV.  Treatment naive.  Viral load 405,000.  Genotype unknown.  Risk factors include IV drug use.  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  Remains in program and on methadone therapy.  PMH includes hypothyroidism, HLD.    He does not drink ETOH.    She endorses some constipation related to longterm opioid use and current methadone therapy.  She is slowly weaning off methadone.  She is immune to Hep B.    Recent imaging 11/2024:  grossly unremarkable ultrasound of the abdomen.    Denies history of jaundice, icterus, edema, bleeding or confusion.    Review of Systems   Constitutional:  Negative for appetite change, chills, fever and unexpected weight change.   HENT:  Negative for mouth sores, nosebleeds, trouble swallowing and voice change.    Eyes:  Negative for visual disturbance.   Respiratory:  Negative for cough, shortness of breath and wheezing.    Cardiovascular:  Negative for chest pain, palpitations and leg swelling.   Gastrointestinal:  Positive for constipation. Negative for abdominal distention, abdominal pain, blood in stool, diarrhea, nausea and vomiting.   Genitourinary:  Negative for decreased urine volume, difficulty urinating, dysuria, frequency, hematuria and urgency.   Musculoskeletal:  Negative for gait problem and joint swelling.   Skin:  Negative for color change, pallor and rash.   Neurological:  Negative for dizziness, tremors, weakness, light-headedness, numbness and headaches.   Hematological:  Does not bruise/bleed easily.   Psychiatric/Behavioral:  Negative for agitation, behavioral problems, confusion and sleep disturbance.        Objective   Physical Exam  Constitutional:       General: She is awake.      Appearance: Normal appearance. She is well-developed.   HENT:      Head: Normocephalic and atraumatic.      Right Ear: Hearing normal.      Left Ear: Hearing  normal.      Nose: Nose normal.      Mouth/Throat:      Lips: Pink.      Mouth: Mucous membranes are moist.   Eyes:      General: Lids are normal.      Extraocular Movements: Extraocular movements intact.      Conjunctiva/sclera: Conjunctivae normal.      Pupils: Pupils are equal, round, and reactive to light.   Cardiovascular:      Rate and Rhythm: Normal rate and regular rhythm.      Pulses: Normal pulses.      Heart sounds: Normal heart sounds.   Pulmonary:      Effort: Pulmonary effort is normal.      Breath sounds: Normal breath sounds.   Abdominal:      General: Abdomen is flat. Bowel sounds are normal.      Palpations: Abdomen is soft.   Musculoskeletal:      Cervical back: Normal range of motion and neck supple.   Feet:      Right foot:      Skin integrity: Skin integrity normal.      Left foot:      Skin integrity: Skin integrity normal.   Skin:     General: Skin is warm.   Neurological:      General: No focal deficit present.      Mental Status: She is alert and oriented to person, place, and time.      Cranial Nerves: Cranial nerves 2-12 are intact.      Sensory: Sensation is intact.      Motor: Motor function is intact.      Coordination: Coordination is intact.      Gait: Gait is intact.   Psychiatric:         Attention and Perception: Attention and perception normal.         Mood and Affect: Mood normal.         Speech: Speech normal.         Behavior: Behavior is cooperative.         Thought Content: Thought content normal.         Cognition and Memory: Cognition normal.         Judgment: Judgment normal.         Current Outpatient Medications   Medication Sig Dispense Refill    acetaminophen (Tylenol) 500 mg tablet acetaminophen (TYLENOL) 500 mg tablet 08/11/2023 Active      albuterol 90 mcg/actuation inhaler Inhale 2 puffs every 6 hours if needed.      aspirin 81 mg EC tablet Take 1 tablet (81 mg) by mouth once daily.      atorvastatin (Lipitor) 20 mg tablet Take 1 tablet (20 mg) by mouth once  daily.      dicyclomine (Bentyl) 20 mg tablet Take 1 tablet by mouth every 6 hours as needed for stomach cramps 10 tablet 0    fluticasone (Flovent) 44 mcg/actuation inhaler Inhale 1 puff twice a day.      furosemide (Lasix) 20 mg tablet Take 1 tablet (20 mg) by mouth once daily. (Patient taking differently: Take 2 tablets (40 mg) by mouth once daily.) 14 tablet 0    gabapentin (Neurontin) 600 mg tablet Take 1 tablet (600 mg) by mouth 3 times a day. 28 tablet 0    ibuprofen 800 mg tablet       levETIRAcetam (Keppra) 500 mg tablet Take 1 tablet (500 mg) by mouth 2 times a day. 30 tablet 0    levothyroxine (Synthroid, Levoxyl) 112 mcg tablet Take 2 tablets (224 mcg) by mouth early in the morning..      melatonin 3 mg tablet Take 1 tablet (3 mg) by mouth as needed at bedtime. 10 tablet 0    methadone (Dolophine) 10 mg/5 mL solution Take 75 mL (150 mg) by mouth once daily.      modafinil (Provigil) 100 mg tablet Take 1 tablet (100 mg) by mouth once daily.      naloxone (Narcan) 4 mg/0.1 mL nasal spray Administer 1 spray (4 mg) into affected nostril(s).      ondansetron ODT (Zofran-ODT) 4 mg disintegrating tablet Dissolve 1 tablet in mouth three times daily as needed for nausea and vomiting 10 tablet 0    rOPINIRole (Requip) 0.25 mg tablet Take 1 tablet (0.25 mg) by mouth 2 times a day. 14 tablet 0    spironolactone (Aldactone) 25 mg tablet Take 1 tablet (25 mg) by mouth once daily. 30 tablet 11    benzonatate (Tessalon) 100 mg capsule  (Patient not taking: Reported on 1/3/2025)      cloNIDine (Catapres) 0.1 mg tablet Take 1 tablet by mouth every 6 hours as needed for anxiety and sweats (Patient not taking: Reported on 1/3/2025) 20 tablet 0    diphenhydrAMINE (BENADryl) 25 mg capsule Take 1 capsule by mouth every 6 hours as needed (Patient not taking: Reported on 1/3/2025) 12 capsule 0    levothyroxine (Synthroid, Levoxyl) 200 mcg tablet Take 1 tablet (200 mcg) by mouth once daily in the morning. (Patient not taking:  "Reported on 1/3/2025) 7 tablet 0    loperamide (Imodium) 2 mg capsule Take 2 capsules (4 mg) by mouth every 6 hours if needed. (Patient not taking: Reported on 1/3/2025) 16 capsule 0    methylPREDNISolone (Medrol Dospak) 4 mg tablets  (Patient not taking: Reported on 1/3/2025)      predniSONE (Deltasone) 20 mg tablet  (Patient not taking: Reported on 1/3/2025)      promethazine (Phenergan) 12.5 mg tablet Take 1-2 tablets by mouth every 6 hours as needed for nausea and vomiting (Patient not taking: Reported on 1/3/2025) 20 tablet 0     No current facility-administered medications for this visit.     LABS:   Lab Results   Component Value Date    ALBUMIN 3.6 11/07/2024    BILITOT 0.7 11/07/2024    ALKPHOS 50 11/07/2024    ALT 35 11/07/2024    AST 42 (H) 11/07/2024    PROT 7.1 11/07/2024    PROT 7.1 11/07/2024      Lab Results   Component Value Date    WBC 6.1 11/07/2024    HGB 9.6 (L) 11/07/2024    HCT 32.5 (L) 11/07/2024    MCV 74 (L) 11/07/2024     11/07/2024     No results found for: \"INR\"     === 11/13/24 ===    US ABDOMEN COMPLETE    - Impression -  1.  Grossly unremarkable ultrasound of the abdomen.    MACRO:  None    Signed by: Maikel Jay 11/14/2024 6:04 AM  Dictation workstation:   [id number]     Assessment/Plan   Problem List Items Addressed This Visit             ICD-10-CM    History of tuberculosis exposure Z20.1    Relevant Orders    T-Spot TB    Chronic hepatitis C without hepatic coma (Multi) - Primary B18.2     44 year old with chronic Hepatitis C infection, genotype unknown.  Treatment naive.    No evidence of advanced fibrosis.  Discussed the natural history of hepatitis C exposure risks, ways to prevent transmission to others, treatment options and cure rates.  Reviewed the importance of keeping all grooming tools including razors, toothbrushes and hairbrushes away from the reach of other.  Advised to avoid sexual contact in the setting of bleeding or genital sores or wounds.     Diet and " exercise encouraged to avoid weight gain and potential fatty liver which may exacerbate liver disease.  Will update labs including work up for causes of chronic liver disease including inheritable, viral and autoimmune causes.  Immune to Hep B.   Once labs and imaging are completed, will start Mavyret x 8 weeks which will be [facility] to  [facility].  Labs at 4 weeks, EOT and 3 months post treatment to confirm SVR.    FU in 6 weeks.              Relevant Medications    glecaprevir-pibrentasvir (Mavyret) 100-40 mg tablet tablet    Other Relevant Orders    Hepatitis B Core Antibody, Total    Hepatitis C Virus (HCV) FibroSure    HCV PCR with Genotype Reflex    Comprehensive Metabolic Panel    CBC and Auto Differential    Hepatitis A Antibody, Total    Drug-induced constipation K59.03    Relevant Medications    polyethylene glycol (Glycolax, Miralax) 17 gram packet            AXEL Cardoza-CNP 01/03/25 10:09 AM

## 2025-01-03 NOTE — ASSESSMENT & PLAN NOTE
44 year old with chronic Hepatitis C infection, genotype unknown.  Treatment naive.    No evidence of advanced fibrosis.  Discussed the natural history of hepatitis C exposure risks, ways to prevent transmission to others, treatment options and cure rates.  Reviewed the importance of keeping all grooming tools including razors, toothbrushes and hairbrushes away from the reach of other.  Advised to avoid sexual contact in the setting of bleeding or genital sores or wounds.     Diet and exercise encouraged to avoid weight gain and potential fatty liver which may exacerbate liver disease.  Will update labs including work up for causes of chronic liver disease including inheritable, viral and autoimmune causes.  Immune to Hep B.   Once labs and imaging are completed, will start Mavyret x 8 weeks which will be sent to  [facility].  Labs at 4 weeks, EOT and 3 months post treatment to confirm SVR.    FU in 6 weeks.

## 2025-01-05 LAB
HCV RNA SERPL NAA+PROBE-ACNC: ABNORMAL IU/ML
HCV RNA SERPL NAA+PROBE-ACNC: DETECTED K[IU]/ML
HCV RNA SERPL NAA+PROBE-LOG IU: 6.26 LOG IU/ML
LABORATORY COMMENT REPORT: ABNORMAL

## 2025-01-06 ENCOUNTER — APPOINTMENT (OUTPATIENT)
Dept: GASTROENTEROLOGY | Facility: CLINIC | Age: 45
End: 2025-01-06
Payer: COMMERCIAL

## 2025-01-06 LAB
NIL(NEG) CONTROL SPOT COUNT: ABNORMAL
PANEL A SPOT COUNT: 0
PANEL B SPOT COUNT: 7
POS CONTROL SPOT COUNT: ABNORMAL
T-SPOT. TB INTERPRETATION: ABNORMAL

## 2025-01-07 LAB
A2 MACROGLOB SERPL-MCNC: 308 MG/DL (ref 110–276)
ALT SERPL W P-5'-P-CCNC: 102 IU/L (ref 0–40)
APO A-I SERPL-MCNC: 106 MG/DL (ref 116–209)
BILIRUB SERPL-MCNC: 0.4 MG/DL (ref 0–1.2)
FIBROSIS SCORING:: ABNORMAL
FIBROSIS STAGE SERPL QL: ABNORMAL
GGT SERPL-CCNC: 37 IU/L (ref 0–60)
HAPTOGLOB SERPL-MCNC: 49 MG/DL (ref 42–296)
INTERPRETATIONS:(HCVFS): ABNORMAL
LABORATORY COMMENT REPORT: ABNORMAL
LIVER FIBR SCORE SERPL CALC.FIBROSURE: 0.52 (ref 0–0.21)
NECROINFLAMM ACTIVITY SCORING:(HCVFS): ABNORMAL
NECROINFLAMMATORY ACT GRADE SERPL QL: ABNORMAL
NECROINFLAMMATORY ACT SCORE SERPL: 0.65 (ref 0–0.17)
SERVICE CMNT-IMP: ABNORMAL
TEST PERFORMANCE INFO SPEC: ABNORMAL

## 2025-01-08 ENCOUNTER — APPOINTMENT (OUTPATIENT)
Dept: CARDIOLOGY | Facility: CLINIC | Age: 45
End: 2025-01-08
Payer: COMMERCIAL

## 2025-01-09 ENCOUNTER — OFFICE VISIT (OUTPATIENT)
Dept: HEMATOLOGY/ONCOLOGY | Facility: CLINIC | Age: 45
End: 2025-01-09
Payer: COMMERCIAL

## 2025-01-09 ENCOUNTER — EDUCATION (OUTPATIENT)
Dept: HEMATOLOGY/ONCOLOGY | Facility: CLINIC | Age: 45
End: 2025-01-09

## 2025-01-09 VITALS
HEART RATE: 72 BPM | RESPIRATION RATE: 16 BRPM | TEMPERATURE: 99 F | WEIGHT: 237.66 LBS | BODY MASS INDEX: 36.14 KG/M2 | OXYGEN SATURATION: 97 % | DIASTOLIC BLOOD PRESSURE: 68 MMHG | SYSTOLIC BLOOD PRESSURE: 98 MMHG

## 2025-01-09 DIAGNOSIS — D50.0 IRON DEFICIENCY ANEMIA DUE TO CHRONIC BLOOD LOSS: ICD-10-CM

## 2025-01-09 LAB — HEPATITIS C VIRAL RNA GENOTYPE LIPA: 3

## 2025-01-09 PROCEDURE — 99214 OFFICE O/P EST MOD 30 MIN: CPT | Performed by: INTERNAL MEDICINE

## 2025-01-09 ASSESSMENT — PAIN SCALES - GENERAL: PAINLEVEL_OUTOF10: 0-NO PAIN

## 2025-01-09 NOTE — PROGRESS NOTES
"Patient seen by Dr. Vitale today in clinic. Reinforcement education provided regarding next steps with plan of care.      PER DR. VITALE'S FUV NOTE TODAY: \"Patient had come for follow-up on January 9, 2025 regarding history of complex anemia with iron deficiency component.  Did receive intravenous Feraheme on November 19, 2027 and December 6, 2024.  He is feeling much better now.  Was evaluated by hepatology services for hepatitis C positivity and may receive Maywort in future.  In the recent past was placed on Provigil for narcolepsy.  CBC revealed WBC 5.4 hemoglobin 11.7 g/dL MCV 79 platelets 2 32,000/mm³.  Previous hemoglobin on November 7, 2024 was 9.6 g/dL.  Patient to follow closely with hepatology services return in 6 months \"    Pt states she will come to [place] for blood work.  FUV 6 mos with labs prior.  Patient verbalizes understanding of plan of care via teachback method.             "

## 2025-01-09 NOTE — PROGRESS NOTES
Patient ID: Charito Daley is a 44 y.o. female.  Referring Physician: Sumit Dowd MD  [address] [facility]  [place] [id number]  Primary Care Provider: GENERIC EXTERNAL DATA PROVIDER  Visit Type: Initial Visit  The patient was referred to me for further evaluation and management of severe anemia.  Subjective    HPI  The patient is a 44-year-old woman with a complex medical history was referred for further evaluation and management of severe anemia.  The patient has a history notable for severe chronic anemia previously diagnosed with iron deficiency was receiving intravenous iron while she lived in [facility].  The patient does not take oral iron because she believes it does not work.  The patient has a history of thyroidectomy for papillary thyroid cancer in 2016 was severely hypothyroid earlier this year after not consistently taking her medication.  She has a history of umbilical hernia repair but no intestinal surgery.  History of seizures now on Keppra no recent episodes.  Chest pain and edema were worked up with several negative CTPA and LE venous Dopplers.  The patient claims that she was severely anemic and was evaluated in [place] [facility] and received 1 unit of packed red blood cell transfusion.  She was then evaluated at [facility] on October 20, 2024 where her hemoglobin was down to 6.8 g/dL she received 1 infusion for Venofer as well as 1 unit of packed red blood cell transfusion.  The patient also claims to be positive for hepatitis C and has not started treatment and was told that until she is clean treatment will not be started.  In the past the patient has used intravenous fentanyl as well as methamphetamine.  Currently the patient lives with significant other in [facility] for rehabilitation, and detox.    At interview on November 7, 2024 the patient narrated entire history.  She complained of easy fatigability but denied history of weight loss, fevers, night sweats, chest  pain, shortness of breath, nausea, vomiting, hematemesis, melena, hematochezia and hematuria.    Patient had come for follow-up on 2025 regarding history of complex anemia with iron deficiency component.  Did receive intravenous Feraheme on 2027 and 2024.  He is feeling much better now.  Was evaluated by hepatology services for hepatitis C positivity and may receive Maywort in future.  In the recent past was placed on Provigil for narcolepsy.    Past medical history:    Severe chronic anemia requiring iron and blood transfusions in the past, papillary thyroid cancer status post thyroidectomy in 2016, history of iron deficiency anemia, history of hepatitis C positivity.  Atypical chest pain,, Hyperlipidemia, hypertension, epilepsy  Past surgical history:    History of thyroidectomy and also radio active iodine.    Mammogram:    2024    Never had colonoscopy.    Family history:    Strong family history of coronary disease in father  of heart attack in his 50s Brother had a heart attack in 30s she has a twin sister with some sort of congenital heart disease who  as a complication of heart failure.    Personal history and social history:    44 years old, , lives in a jail house with significant other, has 4 children, not working, smokes cigarettes as well as smokeless tobacco denies alcohol abuse has used intravenous fentanyl in the past and also methamphetamines.  Review of Systems   All other systems reviewed and are negative.       Objective   BSA: 2.28 meters squared  BP 98/68   Pulse 72   Temp 37.2 °C (99 °F) (Temporal)   Resp 16   Wt 108 kg (237 lb 10.5 oz)   SpO2 97%   BMI 36.14 kg/m²      has a past medical history of History of thyroid cancer (2016).    She has no past medical history of Diabetes mellitus (Multi).   has no past surgical history on file.  Family History   Problem Relation Name Age of Onset    Breast cancer Mother       Thyroid cancer Father      Colon cancer Maternal Grandmother       Oncology History    No history exists.       Charito Daley  reports that she quit smoking about 5 weeks ago. Her smoking use included cigarettes. She uses smokeless tobacco.  She  reports that she does not currently use alcohol.  She  reports that she does not currently use drugs after having used the following drugs: Methamphetamines.    Physical Exam  Constitutional:       Appearance: Normal appearance. She is obese.   HENT:      Head: Normocephalic and atraumatic.      Nose: Nose normal.      Mouth/Throat:      Mouth: Mucous membranes are moist.      Pharynx: Oropharynx is clear.   Eyes:      Extraocular Movements: Extraocular movements intact.      Conjunctiva/sclera: Conjunctivae normal.      Pupils: Pupils are equal, round, and reactive to light.   Cardiovascular:      Rate and Rhythm: Normal rate and regular rhythm.   Pulmonary:      Effort: Pulmonary effort is normal.      Breath sounds: Normal breath sounds.   Abdominal:      General: Abdomen is flat. Bowel sounds are normal.      Palpations: Abdomen is soft.   Musculoskeletal:         General: Normal range of motion.      Cervical back: Normal range of motion and neck supple.   Neurological:      General: No focal deficit present.      Mental Status: She is alert and oriented to person, place, and time. Mental status is at baseline.   Psychiatric:         Mood and Affect: Mood normal.         Behavior: Behavior normal.         Thought Content: Thought content normal.         Judgment: Judgment normal.     Looks older than stated age.    WBC   Date/Time Value Ref Range Status   01/03/2025 11:00 AM 5.4 4.4 - 11.3 x10*3/uL Final   11/07/2024 12:09 PM 6.1 4.4 - 11.3 x10*3/uL Final   10/08/2024 10:20 AM 5.3 4.4 - 11.3 x10*3/uL Final     nRBC   Date Value Ref Range Status   01/03/2025 0.0 0.0 - 0.0 /100 WBCs Final   11/07/2024   Final     Comment:     Not Measured   10/08/2024 0.0 0.0 - 0.0  "/100 WBCs Final     RBC   Date Value Ref Range Status   01/03/2025 4.84 4.00 - 5.20 x10*6/uL Final   11/07/2024 4.39 4.00 - 5.20 x10*6/uL Final   10/08/2024 3.71 (L) 4.00 - 5.20 x10*6/uL Final     Hemoglobin   Date Value Ref Range Status   01/03/2025 11.7 (L) 12.0 - 16.0 g/dL Final   11/07/2024 9.6 (L) 12.0 - 16.0 g/dL Final   10/08/2024 8.2 (L) 12.0 - 16.0 g/dL Final     Hematocrit   Date Value Ref Range Status   01/03/2025 38.1 36.0 - 46.0 % Final   11/07/2024 32.5 (L) 36.0 - 46.0 % Final   10/08/2024 28.5 (L) 36.0 - 46.0 % Final     MCV   Date/Time Value Ref Range Status   01/03/2025 11:00 AM 79 (L) 80 - 100 fL Final   11/07/2024 12:09 PM 74 (L) 80 - 100 fL Final   10/08/2024 10:20 AM 77 (L) 80 - 100 fL Final     MCH   Date/Time Value Ref Range Status   01/03/2025 11:00 AM 24.2 (L) 26.0 - 34.0 pg Final   11/07/2024 12:09 PM 21.9 (L) 26.0 - 34.0 pg Final   10/08/2024 10:20 AM 22.1 (L) 26.0 - 34.0 pg Final     MCHC   Date/Time Value Ref Range Status   01/03/2025 11:00 AM 30.7 (L) 32.0 - 36.0 g/dL Final   11/07/2024 12:09 PM 29.5 (L) 32.0 - 36.0 g/dL Final   10/08/2024 10:20 AM 28.8 (L) 32.0 - 36.0 g/dL Final     RDW   Date/Time Value Ref Range Status   01/03/2025 11:00 AM 24.6 (H) 11.5 - 14.5 % Final   11/07/2024 12:09 PM 20.9 (H) 11.5 - 14.5 % Final   10/08/2024 10:20 AM 18.5 (H) 11.5 - 14.5 % Final     Platelets   Date/Time Value Ref Range Status   01/03/2025 11:00  150 - 450 x10*3/uL Final   11/07/2024 12:09  150 - 450 x10*3/uL Final   10/08/2024 10:20  150 - 450 x10*3/uL Final     No results found for: \"MPV\"  Neutrophils %   Date/Time Value Ref Range Status   01/03/2025 11:00 AM 65.3 40.0 - 80.0 % Final   11/07/2024 12:09 PM 75.0 40.0 - 80.0 % Final   09/16/2024 07:10 PM 60.5 40.0 - 80.0 % Final     Immature Granulocytes %, Automated   Date/Time Value Ref Range Status   01/03/2025 11:00 AM 0.2 0.0 - 0.9 % Final     Comment:     Immature Granulocyte Count (IG) includes promyelocytes, myelocytes " and metamyelocytes but does not include bands. Percent differential counts (%) should be interpreted in the context of the absolute cell counts (cells/UL).   11/07/2024 12:09 PM 0.0 0.0 - 0.9 % Final     Comment:     Immature Granulocyte Count (IG) includes promyelocytes, myelocytes and metamyelocytes but does not include bands. Percent differential counts (%) should be interpreted in the context of the absolute cell counts (cells/UL).   09/16/2024 07:10 PM 0.2 0.0 - 0.9 % Final     Comment:     Immature Granulocyte Count (IG) includes promyelocytes, myelocytes and metamyelocytes but does not include bands. Percent differential counts (%) should be interpreted in the context of the absolute cell counts (cells/UL).     Lymphocytes %   Date/Time Value Ref Range Status   01/03/2025 11:00 AM 23.4 13.0 - 44.0 % Final   11/07/2024 12:09 PM 17.3 13.0 - 44.0 % Final   09/16/2024 07:10 PM 24.7 13.0 - 44.0 % Final     Monocytes %   Date/Time Value Ref Range Status   01/03/2025 11:00 AM 8.0 2.0 - 10.0 % Final   11/07/2024 12:09 PM 6.2 2.0 - 10.0 % Final   09/16/2024 07:10 PM 10.8 2.0 - 10.0 % Final     Eosinophils %   Date/Time Value Ref Range Status   01/03/2025 11:00 AM 2.2 0.0 - 6.0 % Final   11/07/2024 12:09 PM 1.0 0.0 - 6.0 % Final   09/16/2024 07:10 PM 2.7 0.0 - 6.0 % Final     Basophils %   Date/Time Value Ref Range Status   01/03/2025 11:00 AM 0.9 0.0 - 2.0 % Final   11/07/2024 12:09 PM 0.5 0.0 - 2.0 % Final   09/16/2024 07:10 PM 1.1 0.0 - 2.0 % Final     Neutrophils Absolute   Date/Time Value Ref Range Status   01/03/2025 11:00 AM 3.49 1.20 - 7.70 x10*3/uL Final     Comment:     Percent differential counts (%) should be interpreted in the context of the absolute cell counts (cells/uL).   11/07/2024 12:09 PM 4.60 1.20 - 7.70 x10*3/uL Final     Comment:     Percent differential counts (%) should be interpreted in the context of the absolute cell counts (cells/uL).   09/16/2024 07:10 PM 3.31 1.20 - 7.70 x10*3/uL Final     " Comment:     Percent differential counts (%) should be interpreted in the context of the absolute cell counts (cells/uL).     Immature Granulocytes Absolute, Automated   Date/Time Value Ref Range Status   01/03/2025 11:00 AM 0.01 0.00 - 0.70 x10*3/uL Final   11/07/2024 12:09 PM 0.00 0.00 - 0.70 x10*3/uL Final   09/16/2024 07:10 PM 0.01 0.00 - 0.70 x10*3/uL Final     Lymphocytes Absolute   Date/Time Value Ref Range Status   01/03/2025 11:00 AM 1.25 1.20 - 4.80 x10*3/uL Final   11/07/2024 12:09 PM 1.06 (L) 1.20 - 4.80 x10*3/uL Final   09/16/2024 07:10 PM 1.35 1.20 - 4.80 x10*3/uL Final     Monocytes Absolute   Date/Time Value Ref Range Status   01/03/2025 11:00 AM 0.43 0.10 - 1.00 x10*3/uL Final   11/07/2024 12:09 PM 0.38 0.10 - 1.00 x10*3/uL Final   09/16/2024 07:10 PM 0.59 0.10 - 1.00 x10*3/uL Final     Eosinophils Absolute   Date/Time Value Ref Range Status   01/03/2025 11:00 AM 0.12 0.00 - 0.70 x10*3/uL Final   11/07/2024 12:09 PM 0.06 0.00 - 0.70 x10*3/uL Final   09/16/2024 07:10 PM 0.15 0.00 - 0.70 x10*3/uL Final     Basophils Absolute   Date/Time Value Ref Range Status   01/03/2025 11:00 AM 0.05 0.00 - 0.10 x10*3/uL Final   11/07/2024 12:09 PM 0.03 0.00 - 0.10 x10*3/uL Final   09/16/2024 07:10 PM 0.06 0.00 - 0.10 x10*3/uL Final       No components found for: \"PT\"  No results found for: \"APTT\"    Assessment/Plan    The patient is a 44-year-old woman referred to me for further evaluation and management of severe chronic anemia.  Evaluated on November 7, 2024.Severe chronic anemia requiring iron and blood transfusions in the past, papillary thyroid cancer status post thyroidectomy in 2016, history of iron deficiency anemia, history of hepatitis C positivity.  Atypical chest pain,, Hyperlipidemia, hypertension, epilepsy.    Physical examination revealed obesity.  I had a detailed discussion with the patient explained to her it would be prudent to evaluate further with iron indicis, vitamin B12, folate, TSH levels, " haptoglobin, hepatitis B, C and HIV serology and an ultrasound of abdomen.  Depending on what we find we will make further recommendations.    Patient had come for follow-up on January 9, 2025 regarding history of complex anemia with iron deficiency component.  Did receive intravenous Feraheme on November 19, 2027 and December 6, 2024.  He is feeling much better now.  Was evaluated by hepatology services for hepatitis C positivity and may receive Maywort in future.  In the recent past was placed on Provigil for narcolepsy.  CBC revealed WBC 5.4 hemoglobin 11.7 g/dL MCV 79 platelets 2 32,000/mm³.  Previous hemoglobin on November 7, 2024 was 9.6 g/dL.  Patient to follow closely with hepatology services return in 6 months    Thank you for allowing me to participate in care of your patient if you have any questions please feel free to call me.       Diagnoses and all orders for this visit:  Iron deficiency anemia due to chronic blood loss  -     Referral to Hematology and Oncology  -     CBC and Auto Differential; Future  -     Comprehensive Metabolic Panel; Future  -     Ferritin; Future  -     Folate; Future  -     Haptoglobin; Future  -     Hemoglobin Identification with Path Review; Future  -     Iron and TIBC; Future  -     Lactate Dehydrogenase; Future  -     Vitamin B12; Future  -     Vitamin D 25-Hydroxy,Total (for eval of Vitamin D levels); Future  -     De Beque/Lambda Free Light Chain, Serum; Future  -     Serum Protein Electrophoresis + Immunofixation; Future  -     Hepatitis B Surface Antibody; Future  -     Hepatitis C Antibody; Future  -     US abdomen complete; Future  -     Clinic Appointment Request; Future  -     HIV 1/2 Antigen/Antibody Screen with Reflex to Confirmation; Future  Bilateral lower extremity edema  Hyperlipidemia, unspecified hyperlipidemia type  Shortness of breath         Sumit Dowd MD

## 2025-01-10 ENCOUNTER — SPECIALTY PHARMACY (OUTPATIENT)
Dept: PHARMACY | Facility: CLINIC | Age: 45
End: 2025-01-10

## 2025-01-10 PROCEDURE — RXMED WILLOW AMBULATORY MEDICATION CHARGE

## 2025-01-10 NOTE — PROGRESS NOTES
This patient has scheduled their medication delivery with  Specialty Pharmacy.  They should receive their medication 1/14/2025.

## 2025-01-13 ENCOUNTER — PHARMACY VISIT (OUTPATIENT)
Dept: PHARMACY | Facility: CLINIC | Age: 45
End: 2025-01-13
Payer: MEDICAID

## 2025-01-14 ENCOUNTER — SPECIALTY PHARMACY (OUTPATIENT)
Dept: PHARMACY | Facility: CLINIC | Age: 45
End: 2025-01-14

## 2025-01-14 DIAGNOSIS — B18.2 CHRONIC HEPATITIS C WITHOUT HEPATIC COMA (MULTI): Primary | ICD-10-CM

## 2025-01-14 NOTE — PROGRESS NOTES
[facility] Clinical Note  Initial Patient Education     Introduction  Charito Daley is a 44 y.o. female who is on the specialty pharmacy service for management of: Hepatitis C Core.    Charito Daley is initiating the following therapy: glecaprevir-pibrentasvir (Mavyret) 100-40 mg tablet - take 3 tablets by mouth once daily with food     Medication receipt date: 1/14/25  Duration of therapy: 8 weeks    The most recent encounter visit with the referring prescriber MARIELOS Cardoza on 1/3/25 was reviewed.  Pharmacy will continue to collaborate in the care of this patient with the referring prescriber.    Clinical Background  An initial assessment was conducted prior to first fill of the medication to determine the appropriateness of therapy given the patient's diagnosis, medication list, comorbidities, allergies, medical history, patient's ability to self administer medication, and therapeutic goals based on possible outcomes of therapy. Refer to initial assessment task completed on 1/10/25.    Labs for clinical appropriateness that were reviewed include:   Hepatology -   Lab Results   Component Value Date    HCVPCRQUANT 1,840,000 (H) 01/03/2025    HCVGENO 3 01/03/2025    AST 65 (H) 01/03/2025    ALT 86 (H) 01/03/2025    ALKPHOS 55 01/03/2025    HEPBCAB Reactive (A) 01/03/2025     Lab Results   Component Value Date    FIBROSSCORE 0.52 (H) 01/03/2025    FIBROSSTAGE Comment 01/03/2025   F2 fibrosis    Education/Discussion  Charito was contacted on 1/14/2025 at 2:55 PM for a pharmacy visit with encounter number [id number] from:   [facility]  [address] [place] [id number]  Dept: [phone]  Dept Fax: [phone]  Charito consented to a/an Telephone visit, which was performed.    Medication Start Date (planned or actual): 1/15/25  Education was conducted prior to start of therapy? Yes    Education discussed includes the  following:  Patient Education  Counseled the Patient on the Following : Theraputic rationale and expected outcomes, Expected duration of therapy, Doses and administration, Possible side effects and management, Adherence and missed doses, Possible drug interactions, Lab monitoring and follow-up, Safe handling, storage, and disposal, Pharmacy contact information  Learner: Patient  Education Method: Explanation  Education Response: Verbalizes understanding  Additional details of the medication specific counseling are found within the linked patient education flowsheet.     The follow up timeline was discussed. Every person responds to and reacts to therapy differently. Patient should be assessed for efficacy and tolerability in approximately: 12 weeks post therapy completion    Provided education on goals and possible outcomes of therapy:  Adherence with therapy  Timely completion of appropriate labs  Timely and appropriate follow up with provider  Identify and address medication interactions with presciption medications, OTC medications and supplements  Optimize or maintain quality of life  Hepatology - HCV: Achieve SVR 12 (complete clearance of Hepatitis C through undetected HCV RNA 12 weeks post therapy completion)  Identify and address medication interactions with prescription medications, OTC medications and supplements   Prevent re-infection and transmission of HCV     The importance of adherence was discussed and they were advised to take the medication as prescribed by their provider.     Impression/Plan  Review and Assessment   Reviewed During This Encounter: Medications  Medications Assessed for Appropriate Use, Dose, Route, Frequency, and Duration: Yes  Medication Reconciliation Completed: Yes  Drug Interactions Evaluated: Yes  Clinically Relevant Drug Interactions Identified: Yes  List Interactions and Management Plan: Confirmed with patient that she will be holding her atorvastatin while on  Mavtraci.    This patient has not been identified as high risk due to Lack of high risk qualifiers.  The following action was taken: Patient/caregiver encouraged to participate in patient management program.    QOL/Patient Satisfaction  Rate your quality of life on scale of 1-10: 4  Rate your satisfaction with  Specialty Pharmacy on scale of 1-10: 9    The  Specialty Pharmacy Welcome packet may be viewed here:  [email or web address]       Or by scanning QR code:      Provided contact information ([phone]) for [facility] and reviewed dispensing process, refill timeline and patient management follow up. Advised to contact the pharmacy if there are any adverse effects and/or changes to medication list, including prescriptions, OTC medications, herbal products, or supplements. Confirmed understanding of education conducted during assessment. All questions and concerns were addressed and patient was encouraged to reach out for additional questions or concerns.    Medication start date: 1/15/25  Therapy completion: 3/12/25  Anticipated SVR date: 6/4/25      MAREN COYLE, AbrahamD

## 2025-01-20 ENCOUNTER — OFFICE VISIT (OUTPATIENT)
Dept: CARDIOLOGY | Facility: CLINIC | Age: 45
End: 2025-01-20
Payer: COMMERCIAL

## 2025-01-20 VITALS
SYSTOLIC BLOOD PRESSURE: 117 MMHG | HEART RATE: 60 BPM | OXYGEN SATURATION: 95 % | BODY MASS INDEX: 36.49 KG/M2 | DIASTOLIC BLOOD PRESSURE: 81 MMHG | WEIGHT: 240 LBS

## 2025-01-20 DIAGNOSIS — I51.7 RIGHT VENTRICULAR ENLARGEMENT: ICD-10-CM

## 2025-01-20 DIAGNOSIS — I51.7 RIGHT VENTRICULAR DILATION: ICD-10-CM

## 2025-01-20 DIAGNOSIS — Q28.8: ICD-10-CM

## 2025-01-20 DIAGNOSIS — B18.2 CHRONIC HEPATITIS C WITHOUT HEPATIC COMA (MULTI): ICD-10-CM

## 2025-01-20 DIAGNOSIS — K59.03 DRUG-INDUCED CONSTIPATION: ICD-10-CM

## 2025-01-20 DIAGNOSIS — D50.0 IRON DEFICIENCY ANEMIA DUE TO CHRONIC BLOOD LOSS: ICD-10-CM

## 2025-01-20 DIAGNOSIS — Q21.10 ASD (ATRIAL SEPTAL DEFECT) (HHS-HCC): Primary | ICD-10-CM

## 2025-01-20 PROCEDURE — 99214 OFFICE O/P EST MOD 30 MIN: CPT | Performed by: INTERNAL MEDICINE

## 2025-01-20 NOTE — PROGRESS NOTES
Currently denies palpitations, orthopnea, PND.Patient denies headaches, dizziness or recent falls.   Patient states random chest pains, SOB/LEON, BLE edema notes.    She stopped her Lipitor due to her HEP C medication.      Hospitalizations: ED in January 2025 at [facility] for Left arm/ CP

## 2025-01-20 NOTE — PROGRESS NOTES
Advanced Heart Failure Clinic Note    CHIEF COMPLAINT:    No chief complaint on file.       Primary Care Physician: none    HISTORY OF PRESENT ILLNESS:   Charito Daley is a 44 y.o. 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 Given issues of chest pain, she has had several ischemic evaluations with no evidence of obstructive coronary disease and rule out for PE although she has had occasional mildly elevated troponin. She was started on furosemide after an ED visit      She denies history of hypertension, was previously on clonidine but more for anxiety. Her other history has been notable for severe iron deficiency anemia requiring IV iron, history of thyroidectomy for papillary thyroid cancer in 2016 was severely hypothyroid earlier this year after not consistently taking her medication.  She is a history of umbilical hernia repair but no intestinal surgeries history of seizures now on Keppra with no recent episodes.     She is currently living in [place] in a recovery house she is here with her significant other.  She does have a strong family history of coronary disease father  of heart attack in his 50s Brother had a heart attack in his 30s she has a twin sister with some sort of congenital heart disease who  as a complication of heart failure. Currently using methadone and smokes cigarettes, history of heroin, cocaine and amphetamine use none in the last few weeks    At her first visit we started spironolactone and set up echocardiogram, also stopped aspirin and referred her to hematology. At her last visit she was reporting leg swelling and dyspnea, there are improved but not resolved. Appetite is ok, she still has frequent sharp non  exertional chest pain. She has been doing exercise class. Working part time at [employer] but looking at another     Recently started on Mavaret for Hep C, has stop her atorvastatin until she is done with that, has fibroscan later this week    Hep B ab was positive, so Heb B viral load is ordered. She did have an ED visit for left arm paresthesia, told it was due to thyroid disease. She was referred to hematology receiving IV iron, she was also started on provigil and methadone was reduced    Monitors/restricts salt/fluid : not really   Has scale and weighs self daily: no  Has BP cuff and BPs at home:  up and down      Allergies   Allergen Reactions    Benadryl [Diphenhydramine Hcl] Shortness of breath    Cephalexin Anaphylaxis, Angioedema and Shortness of breath    Cephalosporins Anaphylaxis, Angioedema, Shortness of breath and Swelling    Penicillins Shortness of breath, Anaphylaxis, Hives and Swelling    Clindamycin Swelling    Phenergan [Promethazine] Headache       CURRENT MEDICATIONS:    Current Outpatient Medications   Medication Sig Dispense Refill    acetaminophen (Tylenol) 500 mg tablet acetaminophen (TYLENOL) 500 mg tablet 08/11/2023 Active      albuterol 90 mcg/actuation inhaler Inhale 2 puffs every 6 hours if needed.      aspirin 81 mg EC tablet Take 1 tablet (81 mg) by mouth once daily.      atorvastatin (Lipitor) 20 mg tablet Take 1 tablet (20 mg) by mouth once daily.      benzonatate (Tessalon) 100 mg capsule  (Patient not taking: Reported on 1/14/2025)      dicyclomine (Bentyl) 20 mg tablet Take 1 tablet by mouth every 6 hours as needed for stomach cramps (Patient not taking: Reported on 1/14/2025) 10 tablet 0    fluticasone (Flovent) 44 mcg/actuation inhaler Inhale 1 puff twice a day.      furosemide (Lasix) 20 mg tablet Take 1 tablet (20 mg) by mouth once daily. (Patient taking differently: Take 2 tablets (40 mg) by mouth once daily.) 14 tablet 0    gabapentin (Neurontin) 600 mg tablet Take 1 tablet  (600 mg) by mouth 3 times a day. 28 tablet 0    glecaprevir-pibrentasvir (Mavyret) 100-40 mg tablet tablet Take 3 tablets by mouth once daily. 84 tablet 1    ibuprofen 800 mg tablet       levETIRAcetam (Keppra) 500 mg tablet Take 1 tablet (500 mg) by mouth 2 times a day. 30 tablet 0    levothyroxine (Synthroid, Levoxyl) 112 mcg tablet Take 2 tablets (224 mcg) by mouth early in the morning..      levothyroxine (Synthroid, Levoxyl) 200 mcg tablet Take 1 tablet (200 mcg) by mouth once daily in the morning. (Patient not taking: Reported on 1/14/2025) 7 tablet 0    loperamide (Imodium) 2 mg capsule Take 2 capsules (4 mg) by mouth every 6 hours if needed. (Patient not taking: Reported on 1/14/2025) 16 capsule 0    melatonin 3 mg tablet Take 1 tablet (3 mg) by mouth as needed at bedtime. 10 tablet 0    methadone (Dolophine) 10 mg/5 mL solution Take 75 mL (150 mg) by mouth once daily.      methylPREDNISolone (Medrol Dospak) 4 mg tablets  (Patient not taking: Reported on 1/14/2025)      modafinil (Provigil) 100 mg tablet Take 1 tablet (100 mg) by mouth once daily.      naloxone (Narcan) 4 mg/0.1 mL nasal spray Administer 1 spray (4 mg) into affected nostril(s).      ondansetron ODT (Zofran-ODT) 4 mg disintegrating tablet Dissolve 1 tablet in mouth three times daily as needed for nausea and vomiting 10 tablet 0    polyethylene glycol (Glycolax, Miralax) 17 gram packet Take 17 g by mouth once daily. 30 packet 11    predniSONE (Deltasone) 20 mg tablet  (Patient not taking: Reported on 1/14/2025)      rOPINIRole (Requip) 0.25 mg tablet Take 1 tablet (0.25 mg) by mouth 2 times a day. 14 tablet 0    spironolactone (Aldactone) 25 mg tablet Take 1 tablet (25 mg) by mouth once daily. 30 tablet 11     No current facility-administered medications for this visit.         Objective    Problems:   Patient Active Problem List   Diagnosis    Atypical chest pain    Hyperlipidemia    Bilateral lower extremity edema    Iron deficiency anemia  due to chronic blood loss    Shortness of breath    History of tuberculosis exposure    Chronic hepatitis C without hepatic coma (Multi)    Drug-induced constipation     Medical History:   Past Medical History:   Diagnosis Date    History of thyroid cancer 2016      has a past medical history of History of thyroid cancer (2016).    She has no past medical history of Diabetes mellitus (Multi).  Surgical Hx:   No past surgical history on file.    has no past surgical history on file.  Social Hx:   Tobacco Use: High Risk (1/3/2025)    Patient History     Smoking Tobacco Use: Former     Smokeless Tobacco Use: Current     Passive Exposure: Not on file     Alcohol Use: Not At Risk (1/7/2025)    Received from [facility]    AUDIT-[facility]     Frequency of Alcohol Consumption: Never     Average Number of Drinks: Patient does not drink     Frequency of Binge Drinking: Never      reports that she quit smoking about 7 weeks ago. Her smoking use included cigarettes. She uses smokeless tobacco. She reports that she does not currently use alcohol. She reports that she does not currently use drugs after having used the following drugs: Methamphetamines.   Family Hx:   Family History   Problem Relation Name Age of Onset    Breast cancer Mother      Thyroid cancer Father      Colon cancer Maternal Grandmother       family history includes Breast cancer in her mother; Colon cancer in her maternal grandmother; Thyroid cancer in her father.  Allergies:   Allergies   Allergen Reactions    Benadryl [Diphenhydramine Hcl] Shortness of breath    Cephalexin Anaphylaxis, Angioedema and Shortness of breath    Cephalosporins Anaphylaxis, Angioedema, Shortness of breath and Swelling    Penicillins Shortness of breath, Anaphylaxis, Hives and Swelling    Clindamycin Swelling    Phenergan [Promethazine] Headache          PHYSICAL EXAM:   There is no height or weight on file to calculate BMI.    TRENDS:   Vitals:       11/22/2024    12:02 PM 11/27/2024     " 8:41 AM 2024     1:07 PM 2024     4:05 PM 2024     4:29 PM 1/3/2025    10:02 AM 2025     3:45 PM   Vitals   Systolic 90 107 99 101 105 102 98   Diastolic 58 71 69 73 70 70 68   Heart Rate 58 61 57  58 81 72   Temp 36.5 °C (97.7 °F) 36.9 °C (98.4 °F) 36 °C (96.8 °F) 36.2 °C (97.2 °F) 36 °C (96.8 °F) 36.5 °C (97.7 °F) 37.2 °C (99 °F)   Resp 14 14 16 16 16  16   Height   1.728 m (5' 8.03\")   1.727 m (5' 8\")    Weight (lb)  231.59 235.12   242 237.66   BMI  35.26 kg/m2 35.72 kg/m2   36.8 kg/m2 36.14 kg/m2   BSA (m2)  2.24 m2 2.27 m2   2.3 m2 2.28 m2   Visit Report      Report Report     WEIGHT TREND:   Wt Readings from Last 5 Encounters:   25 108 kg (237 lb 10.5 oz)   25 110 kg (242 lb)   24 107 kg (235 lb 1.9 oz)   24 105 kg (231 lb 9.5 oz)   24 104 kg (229 lb 2.7 oz)       Objective      44 y.o.year old female, awake alert orient X 3 in NAD CVP does not seem elevated heart is regular without murmurs lungs were clear abdomen nontender extremities are warm with 1-2+ edema    LABS:    RESUFAST(CHOL:1,HDL:1,LDLF:1,TRI): No results found for: \"CHOL\", \"HDL\", \"LDLF\", \"TRIG\"  GETLABS(6M,2):   Lab on 2025   Component Date Value    T-SPOT. TB Interpretation 2025 Borderline (A)     Panel A Spot Count 2025 0     Panel B Spot Count 2025 7     NIL(NEG) Control Spot Co* 2025 Passed     POS Control Spot Count 2025 Passed     Hepatitis B Core AB- Tot* 2025 Reactive (A)     Fibrosis Score 2025 0.52 (H)     Fibrosis Stage 2025 Comment     Necroinflammat Activity * 2025 0.65 (H)     Necroinflammat Activity * 2025 A3-Severe activity     Alpha 2-Macroglobulins, * 2025 308 (H)     Haptoglobin 2025 49     Apolipoprotein A-1 2025 106 (L)     Bilirubin, Total 2025 0.4     GGT 2025 37     ALT (SGPT) P5P 2025 102 (H)     Interpretations: 2025 Comment     Fibrosis Scorin2025 " Comment     Necroinflamm Activity Sc* 01/03/2025 Comment     Limitations: 01/03/2025 Comment     Comment: 01/03/2025 Comment     Comment : 01/03/2025 Comment     HCV PCR Quant 01/03/2025 1,840,000 (H)     Hepatitis C RNA PCR Log 01/03/2025 6.26     Hepatitis C PCR with Gen* 01/03/2025 Aliquot sent to [facility] for genotyping     HCV RNA Result 01/03/2025 Detected (A)     Glucose 01/03/2025 104 (H)     Sodium 01/03/2025 138     Potassium 01/03/2025 4.5     Chloride 01/03/2025 103     Bicarbonate 01/03/2025 25     Anion Gap 01/03/2025 15     Urea Nitrogen 01/03/2025 20     Creatinine 01/03/2025 0.86     eGFR 01/03/2025 86     Calcium 01/03/2025 9.2     Albumin 01/03/2025 3.9     Alkaline Phosphatase 01/03/2025 55     Total Protein 01/03/2025 7.2     AST 01/03/2025 65 (H)     Bilirubin, Total 01/03/2025 0.5     ALT 01/03/2025 86 (H)     WBC 01/03/2025 5.4     nRBC 01/03/2025 0.0     RBC 01/03/2025 4.84     Hemoglobin 01/03/2025 11.7 (L)     Hematocrit 01/03/2025 38.1     MCV 01/03/2025 79 (L)     MCH 01/03/2025 24.2 (L)     MCHC 01/03/2025 30.7 (L)     RDW 01/03/2025 24.6 (H)     Platelets 01/03/2025 232     Neutrophils % 01/03/2025 65.3     Immature Granulocytes %,* 01/03/2025 0.2     Lymphocytes % 01/03/2025 23.4     Monocytes % 01/03/2025 8.0     Eosinophils % 01/03/2025 2.2     Basophils % 01/03/2025 0.9     Neutrophils Absolute 01/03/2025 3.49     Immature Granulocytes Ab* 01/03/2025 0.01     Lymphocytes Absolute 01/03/2025 1.25     Monocytes Absolute 01/03/2025 0.43     Eosinophils Absolute 01/03/2025 0.12     Basophils Absolute 01/03/2025 0.05     Hepatitis A  AB-Total 01/03/2025 Reactive (A)     RBC Morphology 01/03/2025 See Below     Ovalocytes 01/03/2025 Few     Hepatitis C Genotype 01/03/2025 3    Office Visit on 11/07/2024   Component Date Value    WBC 11/07/2024 6.1     nRBC 11/07/2024      RBC 11/07/2024 4.39     Hemoglobin 11/07/2024 9.6 (L)     Hematocrit 11/07/2024 32.5 (L)     MCV 11/07/2024 74 (L)      MCH 11/07/2024 21.9 (L)     MCHC 11/07/2024 29.5 (L)     RDW 11/07/2024 20.9 (H)     Platelets 11/07/2024 282     Neutrophils % 11/07/2024 75.0     Immature Granulocytes %,* 11/07/2024 0.0     Lymphocytes % 11/07/2024 17.3     Monocytes % 11/07/2024 6.2     Eosinophils % 11/07/2024 1.0     Basophils % 11/07/2024 0.5     Neutrophils Absolute 11/07/2024 4.60     Immature Granulocytes Ab* 11/07/2024 0.00     Lymphocytes Absolute 11/07/2024 1.06 (L)     Monocytes Absolute 11/07/2024 0.38     Eosinophils Absolute 11/07/2024 0.06     Basophils Absolute 11/07/2024 0.03     Glucose 11/07/2024 79     Sodium 11/07/2024 136     Potassium 11/07/2024 4.5     Chloride 11/07/2024 103     Bicarbonate 11/07/2024 26     Anion Gap 11/07/2024 12     Urea Nitrogen 11/07/2024 12     Creatinine 11/07/2024 0.62     eGFR 11/07/2024 >90     Calcium 11/07/2024 8.5 (L)     Albumin 11/07/2024 3.6     Alkaline Phosphatase 11/07/2024 50     Total Protein 11/07/2024 7.1     AST 11/07/2024 42 (H)     Bilirubin, Total 11/07/2024 0.7     ALT 11/07/2024 35     Ferritin 11/07/2024 43     Folate, Serum 11/07/2024 >24.0     Haptoglobin 11/07/2024 79     Hemoglobin A 11/07/2024 97.3     Hemoglobin F 11/07/2024 0.3     Hemoglobin A2 11/07/2024 2.4     Hemoglobin Identificatio* 11/07/2024 Normal     Pathologist Review-Hemog* 11/07/2024 Electronically signed out by Skylar Dean MD PhD on 11/12/24 at 8:51 PM.  By the signature on this report, the individual or group listed as making the Final Interpretation/Diagnosis certifies that they have reviewed this case.     Iron 11/07/2024 38     UIBC 11/07/2024 441 (H)     TIBC 11/07/2024 479 (H)     % Saturation 11/07/2024 8 (L)     LDH 11/07/2024 269 (H)     Vitamin B12 11/07/2024 435     Vitamin D, 25-Hydroxy, T* 11/07/2024 43     Ig Johnston Free Light Chain 11/07/2024 5.58 (H)     Ig Lambda Free Light Paulina* 11/07/2024 3.59 (H)     Kappa/Lambda Ratio 11/07/2024 1.55     Hepatitis B Surface AB 11/07/2024 29.7  (H)     Hepatitis C AB 11/07/2024 Reactive (A)     HIV 1/2 Antigen/Antibody* 11/07/2024 Nonreactive     Total Protein 11/07/2024 7.1     Albumin 11/07/2024 3.5     Alpha 1 Globulin 11/07/2024 0.3     Alpha 2 Globulin 11/07/2024 0.7     Beta Globulin 11/07/2024 0.8     Gamma 11/07/2024 1.8 (H)     Protein Electrophoresis * 11/07/2024 Increase in polyclonal gamma globulins.       Immunofixation Comment 11/07/2024 No monoclonal protein detected by immunofixation.     Path Review - Serum Prot* 11/07/2024 Reviewed and approved by ONEL HACKETT on 11/12/24 at 10:08 AM.         Path Review - Serum Immu* 11/07/2024 Reviewed and approved by ONEL HACKETT on 11/12/24 at 10:08 AM.         HCV PCR Quant 11/07/2024 401,000 (H)     Hepatitis C RNA PCR Log 11/07/2024 5.60     HCV RNA Result 11/07/2024 Detected (A)    Hospital Outpatient Visit on 10/08/2024   Component Date Value    LVOT diam 10/08/2024 2.20     LV Biplane EF 10/08/2024 65     MV E/A ratio 10/08/2024 1.72     MV avg E/e' ratio 10/08/2024 8.00     Tricuspid annular plane * 10/08/2024 3.1     LA vol index A/L 10/08/2024 52.3     AV pk diogenes 10/08/2024 1.57     LV EF 10/08/2024 65     RV free wall pk S' 10/08/2024 13.30     RVSP 10/08/2024 29.6     LVIDd 10/08/2024 5.10     Aortic Valve Area by Con* 10/08/2024 2.76     AV pk grad 10/08/2024 9.9     LV A4C EF 10/08/2024 67.0     BNP 10/08/2024 383 (H)     Glucose 10/08/2024 69 (L)     Sodium 10/08/2024 138     Potassium 10/08/2024 4.2     Chloride 10/08/2024 103     Bicarbonate 10/08/2024 29     Anion Gap 10/08/2024 10     Urea Nitrogen 10/08/2024 11     Creatinine 10/08/2024 0.72     eGFR 10/08/2024 >90     Calcium 10/08/2024 8.8     WBC 10/08/2024 5.3     nRBC 10/08/2024 0.0     RBC 10/08/2024 3.71 (L)     Hemoglobin 10/08/2024 8.2 (L)     Hematocrit 10/08/2024 28.5 (L)     MCV 10/08/2024 77 (L)     MCH 10/08/2024 22.1 (L)     MCHC 10/08/2024 28.8 (L)     RDW 10/08/2024 18.5 (H)     Platelets 10/08/2024  285     Iron 10/08/2024 25 (L)     UIBC 10/08/2024 >450 (H)     TIBC 10/08/2024      % Saturation 10/08/2024     Admission on 09/16/2024, Discharged on 09/16/2024   Component Date Value    WBC 09/16/2024 5.5     nRBC 09/16/2024 0.0     RBC 09/16/2024 3.26 (L)     Hemoglobin 09/16/2024 8.0 (L)     Hematocrit 09/16/2024 26.3 (L)     MCV 09/16/2024 81     MCH 09/16/2024 24.5 (L)     MCHC 09/16/2024 30.4 (L)     RDW 09/16/2024 18.6 (H)     Platelets 09/16/2024 228     Neutrophils % 09/16/2024 60.5     Immature Granulocytes %,* 09/16/2024 0.2     Lymphocytes % 09/16/2024 24.7     Monocytes % 09/16/2024 10.8     Eosinophils % 09/16/2024 2.7     Basophils % 09/16/2024 1.1     Neutrophils Absolute 09/16/2024 3.31     Immature Granulocytes Ab* 09/16/2024 0.01     Lymphocytes Absolute 09/16/2024 1.35     Monocytes Absolute 09/16/2024 0.59     Eosinophils Absolute 09/16/2024 0.15     Basophils Absolute 09/16/2024 0.06     Glucose 09/16/2024 83     Sodium 09/16/2024 137     Potassium 09/16/2024 4.2     Chloride 09/16/2024 103     Bicarbonate 09/16/2024 28     Anion Gap 09/16/2024 10     Urea Nitrogen 09/16/2024 14     Creatinine 09/16/2024 0.97     eGFR 09/16/2024 75     Calcium 09/16/2024 8.3 (L)    Admission on 09/13/2024, Discharged on 09/13/2024   Component Date Value    WBC 09/13/2024 4.2 (L)     nRBC 09/13/2024 0.0     RBC 09/13/2024 2.79 (L)     Hemoglobin 09/13/2024 6.8 (L)     Hematocrit 09/13/2024 22.2 (L)     MCV 09/13/2024 80     MCH 09/13/2024 24.4 (L)     MCHC 09/13/2024 30.6 (L)     RDW 09/13/2024 19.0 (H)     Platelets 09/13/2024 232     Neutrophils % 09/13/2024 54.5     Immature Granulocytes %,* 09/13/2024 0.2     Lymphocytes % 09/13/2024 33.3     Monocytes % 09/13/2024 8.4     Eosinophils % 09/13/2024 2.4     Basophils % 09/13/2024 1.2     Neutrophils Absolute 09/13/2024 2.26     Immature Granulocytes Ab* 09/13/2024 0.01     Lymphocytes Absolute 09/13/2024 1.38     Monocytes Absolute 09/13/2024 0.35      Eosinophils Absolute 09/13/2024 0.10     Basophils Absolute 09/13/2024 0.05     Glucose 09/13/2024 79     Sodium 09/13/2024 135 (L)     Potassium 09/13/2024 4.0     Chloride 09/13/2024 101     Bicarbonate 09/13/2024 31     Anion Gap 09/13/2024 7 (L)     Urea Nitrogen 09/13/2024 18     Creatinine 09/13/2024 1.07 (H)     eGFR 09/13/2024 66     Calcium 09/13/2024 7.8 (L)     ABO TYPE 09/13/2024 A     Rh TYPE 09/13/2024 POS     ANTIBODY SCREEN 09/13/2024 NEG     Thyroid Stimulating Horm* 09/13/2024 62.00 (H)     ABO TYPE 09/13/2024 A     Rh TYPE 09/13/2024 POS     RBC Morphology 09/13/2024 See Below     Polychromasia 09/13/2024 Mild     Hypochromia 09/13/2024 Mild     PRODUCT CODE 09/13/2024 [id number]     Unit Number 09/13/2024 [id number]     Unit ABO 09/13/2024 A     Unit RH 09/13/2024 POS     XM INTEP 09/13/2024 COMP     Dispense Status 09/13/2024 TR     Blood Expiration Date 09/13/2024 10/1/2024 11:59:00 PM EDT     PRODUCT BLOOD TYPE 09/13/2024 6200     UNIT VOLUME 09/13/2024 350     Thyroxine, Free 09/13/2024 0.80    Admission on 09/11/2024, Discharged on 09/12/2024   Component Date Value    WBC 09/11/2024 5.7     nRBC 09/11/2024 0.0     RBC 09/11/2024 2.97 (L)     Hemoglobin 09/11/2024 7.3 (L)     Hematocrit 09/11/2024 23.9 (L)     MCV 09/11/2024 81     MCH 09/11/2024 24.6 (L)     MCHC 09/11/2024 30.5 (L)     RDW 09/11/2024 19.0 (H)     Platelets 09/11/2024 242     Neutrophils % 09/11/2024 60.8     Immature Granulocytes %,* 09/11/2024 0.4     Lymphocytes % 09/11/2024 24.8     Monocytes % 09/11/2024 9.8     Eosinophils % 09/11/2024 2.6     Basophils % 09/11/2024 1.6     Neutrophils Absolute 09/11/2024 3.46     Immature Granulocytes Ab* 09/11/2024 0.02     Lymphocytes Absolute 09/11/2024 1.41     Monocytes Absolute 09/11/2024 0.56     Eosinophils Absolute 09/11/2024 0.15     Basophils Absolute 09/11/2024 0.09     Glucose 09/11/2024 79     Sodium 09/11/2024 136     Potassium 09/11/2024 4.0     Chloride 09/11/2024  102     Bicarbonate 09/11/2024 29     Anion Gap 09/11/2024 9 (L)     Urea Nitrogen 09/11/2024 17     Creatinine 09/11/2024 1.02     eGFR 09/11/2024 70     Calcium 09/11/2024 8.2 (L)     Albumin 09/11/2024 3.8     Alkaline Phosphatase 09/11/2024 46     Total Protein 09/11/2024 7.3     AST 09/11/2024 51 (H)     Bilirubin, Total 09/11/2024 0.3     ALT 09/11/2024 42     BNP 09/11/2024 288 (H)     D-Dimer Non VTE, Quant (* 09/11/2024 1,286 (H)     Ventricular Rate 09/11/2024 63     Atrial Rate 09/11/2024 63     AR Interval 09/11/2024 141     QRS Duration 09/11/2024 99     QT Interval 09/11/2024 447     QTC Calculation(Bazett) 09/11/2024 458     P Axis 09/11/2024 39     R Axis 09/11/2024 0     T Axis 09/11/2024 -1     QRS Count 09/11/2024 10     Q Onset 09/11/2024 253     T Offset 09/11/2024 477     QTC Fredericia 09/11/2024 454     Troponin I, High Sensiti* 09/11/2024 4     Troponin I, High Sensiti* 09/11/2024 3    Lab Requisition on 09/11/2024   Component Date Value    Glucose 09/11/2024 77     Sodium 09/11/2024 138     Potassium 09/11/2024 4.0     Chloride 09/11/2024 103     Bicarbonate 09/11/2024 30     Anion Gap 09/11/2024 9 (L)     Urea Nitrogen 09/11/2024 17     Creatinine 09/11/2024 1.06 (H)     eGFR 09/11/2024 67     Calcium 09/11/2024 8.3 (L)     Albumin 09/11/2024 3.6     Alkaline Phosphatase 09/11/2024 42     Total Protein 09/11/2024 7.0     AST 09/11/2024 47 (H)     Bilirubin, Total 09/11/2024 0.4     ALT 09/11/2024 39     WBC 09/11/2024 5.0     nRBC 09/11/2024 0.0     RBC 09/11/2024 2.90 (L)     Hemoglobin 09/11/2024 7.0 (L)     Hematocrit 09/11/2024 23.2 (L)     MCV 09/11/2024 80     MCH 09/11/2024 24.1 (L)     MCHC 09/11/2024 30.2 (L)     RDW 09/11/2024 19.2 (H)     Platelets 09/11/2024 230     Neutrophils % 09/11/2024 61.9     Immature Granulocytes %,* 09/11/2024 0.2     Lymphocytes % 09/11/2024 24.9     Monocytes % 09/11/2024 9.0     Eosinophils % 09/11/2024 2.8     Basophils % 09/11/2024 1.2      Neutrophils Absolute 09/11/2024 3.11     Immature Granulocytes Ab* 09/11/2024 0.01     Lymphocytes Absolute 09/11/2024 1.25     Monocytes Absolute 09/11/2024 0.45     Eosinophils Absolute 09/11/2024 0.14     Basophils Absolute 09/11/2024 0.06     Thyroid Stimulating Horm* 09/11/2024 63.00 (H)     Triiodothyronine 09/11/2024 105     Thyroxine 09/11/2024 9.8    Lab Requisition on 09/05/2024   Component Date Value    WBC 09/05/2024 4.4     nRBC 09/05/2024 0.0     RBC 09/05/2024 3.34 (L)     Hemoglobin 09/05/2024 8.2 (L)     Hematocrit 09/05/2024 26.8 (L)     MCV 09/05/2024 80     MCH 09/05/2024 24.6 (L)     MCHC 09/05/2024 30.6 (L)     RDW 09/05/2024 20.0 (H)     Platelets 09/05/2024 308     Neutrophils % 09/05/2024 56.4     Immature Granulocytes %,* 09/05/2024 0.5     Lymphocytes % 09/05/2024 31.1     Monocytes % 09/05/2024 9.1     Eosinophils % 09/05/2024 1.8     Basophils % 09/05/2024 1.1     Neutrophils Absolute 09/05/2024 2.48     Immature Granulocytes Ab* 09/05/2024 0.02     Lymphocytes Absolute 09/05/2024 1.37     Monocytes Absolute 09/05/2024 0.40     Eosinophils Absolute 09/05/2024 0.08     Basophils Absolute 09/05/2024 0.05     Glucose 09/05/2024 79     Sodium 09/05/2024 138     Potassium 09/05/2024 4.4     Chloride 09/05/2024 100     Bicarbonate 09/05/2024 29     Anion Gap 09/05/2024 13     Urea Nitrogen 09/05/2024 23     Creatinine 09/05/2024 1.15 (H)     eGFR 09/05/2024 61     Calcium 09/05/2024 8.1 (L)     Albumin 09/05/2024 3.6     Alkaline Phosphatase 09/05/2024 43     Total Protein 09/05/2024 6.6     AST 09/05/2024 48 (H)     Bilirubin, Total 09/05/2024 0.4     ALT 09/05/2024 42     Thyroid Stimulating Horm* 09/05/2024 83.00 (H)     Thyroxine 09/05/2024 8.5     Triiodothyronine 09/05/2024 100     Extra Tube 09/05/2024 Hold for add-ons.     Extra Tube 09/05/2024 Hold for add-ons.    Admission on 08/27/2024, Discharged on 08/28/2024   Component Date Value    WBC 08/27/2024 4.8     nRBC 08/27/2024 0.0      RBC 08/27/2024 3.28 (L)     Hemoglobin 08/27/2024 8.3 (L)     Hematocrit 08/27/2024 26.8 (L)     MCV 08/27/2024 82     MCH 08/27/2024 25.3 (L)     MCHC 08/27/2024 31.0 (L)     RDW 08/27/2024 21.8 (H)     Platelets 08/27/2024 240     Neutrophils % 08/27/2024 62.1     Immature Granulocytes %,* 08/27/2024 0.4     Lymphocytes % 08/27/2024 27.6     Monocytes % 08/27/2024 8.0     Eosinophils % 08/27/2024 0.8     Basophils % 08/27/2024 1.1     Neutrophils Absolute 08/27/2024 2.95     Immature Granulocytes Ab* 08/27/2024 0.02     Lymphocytes Absolute 08/27/2024 1.31     Monocytes Absolute 08/27/2024 0.38     Eosinophils Absolute 08/27/2024 0.04     Basophils Absolute 08/27/2024 0.05     Glucose 08/27/2024 97     Sodium 08/27/2024 137     Potassium 08/27/2024 3.9     Chloride 08/27/2024 105     Bicarbonate 08/27/2024 28     Anion Gap 08/27/2024 8 (L)     Urea Nitrogen 08/27/2024 23     Creatinine 08/27/2024 1.15 (H)     eGFR 08/27/2024 61     Calcium 08/27/2024 8.4 (L)     Albumin 08/27/2024 3.9     Alkaline Phosphatase 08/27/2024 47     Total Protein 08/27/2024 7.3     AST 08/27/2024 57 (H)     Bilirubin, Total 08/27/2024 0.4     ALT 08/27/2024 44     HCG, Urine 08/27/2024 NEGATIVE     Color, Urine 08/27/2024 Yellow     Appearance, Urine 08/27/2024 Clear     Specific Gravity, Urine 08/27/2024 1.031     pH, Urine 08/27/2024 6.0     Protein, Urine 08/27/2024 10 (TRACE)     Glucose, Urine 08/27/2024 Normal     Blood, Urine 08/27/2024 NEGATIVE     Ketones, Urine 08/27/2024 NEGATIVE     Bilirubin, Urine 08/27/2024 NEGATIVE     Urobilinogen, Urine 08/27/2024 2 (1+) (A)     Nitrite, Urine 08/27/2024 NEGATIVE     Leukocyte Esterase, Urine 08/27/2024 25 Shun/µL (A)     Extra Tube 08/27/2024 Hold for add-ons.     WBC, Urine 08/27/2024 21-50 (A)     RBC, Urine 08/27/2024 NONE     Squamous Epithelial Cell* 08/27/2024 1-9 (SPARSE)     Urine Culture 08/27/2024 No growth     RBC Morphology 08/27/2024 See Below     Ovalocytes 08/27/2024 Few  "    Acanthocytes 08/27/2024 Few    Lab Requisition on 08/23/2024   Component Date Value    Coronavirus 2019, PCR 08/23/2024 Not Detected    There may be more visits with results that are not included.     LABBRIEF(HGB:3)   Lab Results   Component Value Date    HGB 11.7 (L) 01/03/2025    HGB 9.6 (L) 11/07/2024    HGB 8.2 (L) 10/08/2024       CMP:  Recent Labs     01/03/25  1100 11/07/24  1209 10/08/24  1020 09/16/24 1910 09/13/24  1153    136 138 137 135*   K 4.5 4.5 4.2 4.2 4.0    103 103 103 101   CO2 25 26 29 28 31   ANIONGAP 15 12 10 10 7*   BUN 20 12 11 14 18   CREATININE 0.86 0.62 0.72 0.97 1.07*   EGFR 86 >90 >90 75 66     Recent Labs     01/03/25  1100 11/07/24  1209 09/11/24  2052 09/11/24  1103 09/05/24  1050   ALBUMIN 3.9 3.6 3.8 3.6 3.6   ALKPHOS 55 50 46 42 43   ALT 86* 35 42 39 42   AST 65* 42* 51* 47* 48*   BILITOT 0.5 0.7 0.3 0.4 0.4     CBC:  Recent Labs     01/03/25  1100 11/07/24  1209 10/08/24  1020 09/16/24 1910 09/13/24  1153   WBC 5.4 6.1 5.3 5.5 4.2*   HGB 11.7* 9.6* 8.2* 8.0* 6.8*   HCT 38.1 32.5* 28.5* 26.3* 22.2*    282 285 228 232   MCV 79* 74* 77* 81 80     HEME/ENDO:  Recent Labs     11/21/24  1519 11/07/24  1209 09/13/24  1153 09/11/24  1103 09/05/24  1050 12/25/23  0500 12/10/22  0402   FERRITIN  --  43  --   --   --   --   --    IRONSAT  --  8*  --   --   --   --   --    TSH  --   --  62.00* 63.00* 83.00*  --   --    HGBA1C 5  --   --   --   --  5.8* 5.4      CARDIAC:   Recent Labs     11/07/24  1209 10/08/24  1020 09/11/24 2214 09/11/24 2052 08/17/24  1208 08/17/24  1056   *  --   --   --   --   --    TROPHS  --   --  3 4 5 6   BNP  --  383*  --  288*  --  22   No results for input(s): \"CHOL\", \"LDLF\", \"HDL\", \"TRIG\" in the last 30302 hours.    BNP   Date Value Ref Range Status   10/08/2024 383 (H) 0 - 99 pg/mL Final   09/11/2024 288 (H) 0 - 99 pg/mL Final   08/17/2024 22 0 - 99 pg/mL Final         DIAGNOSTIC STUDIES REVIEWED:        EKG:    Encounter " Date: 09/11/24   ECG 12 lead   Result Value    Ventricular Rate 63    Atrial Rate 63    NV Interval 141    QRS Duration 99    QT Interval 447    QTC Calculation(Bazett) 458    P Axis 39    R Axis 0    T Axis -1    QRS Count 10    Q Onset 253    T Offset 477    QTC Fredericia 454    Narrative    Sinus rhythm  Abnormal R-wave progression, early transition    See ED provider note for full interpretation and clinical correlation  Confirmed by Sherine Green ([id number]) on 9/15/2024 1:05:16 PM       ECHO   10/2024   1. The left ventricular systolic function is normal, with a Vazquez's biplane calculated ejection fraction of 65%.   2. Spectral Doppler shows a pseudonormal pattern of left ventricular diastolic filling.   3. There is normal right ventricular global systolic function.   4. Severely enlarged right ventricle.   5. The left atrium is moderately dilated.   6. The right atrium is moderately dilated.   7. A bubble study using agitated saline was performed. Bubble study is positive.    2023    1. Left ventricular systolic function is normal with an ejection fraction by   Biplane Method of Discs of 63 %.     2. There is normal diastolic function.     3. There is no pulmonary hypertension, estimated right ventricle systolic   pressure is 19 mmHg.     2021  1. Normal transthoracic echocardiogram.     2. Left ventricular systolic function is normal with an ejection fraction by   Biplane Method of Discs of 58 %.     3. No significant valvular abnormalities.     4. There is no pulmonary hypertension, estimated right ventricle systolic   pressure is 36 mmHg.     5. Turbulent flow in the distal pulmonary artery is suspicious for patent   ductus arteriosus.  Would correlate with clinical findings (continuous   murmur).  There is no right heart enlargement or evidence of elevated right   heart pressures to suggest that this is clinically significant.     6. Compared to the prior study from 2019, there is no significant  interval   change.       CORONARY CT ANGIOGRAM    EXTRACARDIAC STRUCTURES: Mild cardiomegaly.  No pericardial effusion. Visualized aorta is normal in caliber. Pulmonary trunk measures approximately 3.1 cm in diameter.  No adenopathy.  Subtle bilateral multifocal ground-glass opacities.  No pleural effusion or pneumothorax. No concerning pulmonary nodules. Limited images of the upper abdomen demonstrate no acute abnormality. No aggressive osseous lesions.     IMPRESSION:     Subtle multifocal bilateral ground-glass opacities versus mosaic attenuation.  Mild pulmonary edema or air trapping related to small airways disease should be considered.  Viral or atypical pneumonia cannot be excluded.      Dilated pulmonary trunk suggesting elevated pulmonary artery pressures.     1. Total Agatston score of 16   2. Normal origin of the coronary arteries with no evidence of anomalous   coronaries.   3. Left Main Coronary Artery is normal without atherosclerotic disease.   4. Left Anterior Descending Artery has minimal luminal irregularities   5. Circumflex Artery has minimal luminal irregularities.   6. Right Coronary Artery has minimal luminal irregularities       PET 2024  1. Non-diagnostic pharmacologic stress electrocardiogram secondary to   resting electrocardiographic abnormalities.     2. Suggestion of a medium, moderate to severe fixed defect throughout the   inferior wall. However, inferior attenuation artifact is present and most   prominent on stress images, where free pertechnetate appears to be in the   stomach.  There may be no true fixed defect, as there is normal LVEF and wall   motion. Regardless, no findings of inducible ischemia.           ASSESSMENT AND PLAN:   Patient Active Problem List   Diagnosis    Atypical chest pain    Hyperlipidemia    Bilateral lower extremity edema    Iron deficiency anemia due to chronic blood loss    Shortness of breath    History of tuberculosis exposure    Chronic hepatitis C  without hepatic coma (Multi)    Drug-induced constipation       Suspected HFpEF she has edema dyspnea and elevated BNP with a normal LVEF, will obtain MRI as noted below to eval for infiltrative/hypertrophic disease of other cardiomyopathy but she has responded well to the addition of MRA, although she is a bit young    We discussed option of increasing spironolactone to 50mg but she is happy with progress to this point wanted to continue current diuretic regimen    RV dilation/positive bubble study will obtain cardiac MRI both for anatomic evaluation as well as to assess degree and location of shunting- I do think the RV is dilated but does not appear as severe as reported. We did discuss potential she will need evaluation for shunt closure    Nonobstructive coronary artery disease numerous admissions for chest pain these appear to be in the setting of stimulant use, coronary CTA does not indicate substantial coronary artery disease but should continue statin for primary prevention at least (we discussed ok to hold while being treated for HCC)    Chronic liver disease now under treatment for hep C, hep B appears to be cleared infection    Severe iron deficiency anemia improving with iron supplementation and discontinuation of aspirin    Hypothyroidism she reports he is currently taking thyroid replacement, has not re-established with endocrine.     Polysubstance abuse she reports cessation of stimulants but has is still smoking      I discussed heart failure therapeutic options, including: medical therapy, salt and fluid restriction, the need to monitor BP and weight, exercise and weight loss, smoking cessation, alcohol and drug cessation, need to control hypertension, follow up and medication changes.      I spent  32 minutes coordinating care, reviewing EKG/outside testing/labs and discussing options and adjusting medications.     I discussed with patient and family    I spent >50 percent of the time counseling  the patient and discussing the diagnosis, general prognosis and plan of care with the patient       Orders placed during the encounter:   No orders of the defined types were placed in this encounter.     Followup Appts:  Future Appointments   Date Time Provider Department Center   1/20/2025 11:00 AM Shane Dean MD [id number] [place]   1/21/2025  9:00 AM [facility] [place] [id number] [id number] [facility]   7/17/2025  1:40 PM Sumit Dowd MD [id number] [facility]       Shane Bennett IV, MD  Heart Failure, Heart Transplant and  Mechanical Circulatory Support

## 2025-01-20 NOTE — PATIENT INSTRUCTIONS
To reach Dr. Dean's office please call Shawna: [phone]. Fax [phone]. Call [phone] to schedule an appointment. You may also contact the HF RNs at [email or web address]    No medication change for now  Schedule cardiac MRI (Basilio) before your next visit  Return in May

## 2025-01-21 ENCOUNTER — CLINICAL SUPPORT (OUTPATIENT)
Dept: GASTROENTEROLOGY | Facility: CLINIC | Age: 45
End: 2025-01-21
Payer: COMMERCIAL

## 2025-01-21 DIAGNOSIS — B18.2 CHRONIC HEPATITIS C WITHOUT HEPATIC COMA (MULTI): ICD-10-CM

## 2025-01-21 PROBLEM — I51.7 RIGHT VENTRICULAR DILATION: Status: ACTIVE | Noted: 2025-01-21

## 2025-01-21 PROBLEM — Q28.8: Status: ACTIVE | Noted: 2025-01-21

## 2025-01-21 PROCEDURE — 91200 LIVER ELASTOGRAPHY: CPT | Performed by: STUDENT IN AN ORGANIZED HEALTH CARE EDUCATION/TRAINING PROGRAM

## 2025-02-08 ENCOUNTER — SPECIALTY PHARMACY (OUTPATIENT)
Dept: PHARMACY | Facility: CLINIC | Age: 45
End: 2025-02-08

## 2025-02-10 PROCEDURE — RXMED WILLOW AMBULATORY MEDICATION CHARGE

## 2025-02-12 ENCOUNTER — PHARMACY VISIT (OUTPATIENT)
Dept: PHARMACY | Facility: CLINIC | Age: 45
End: 2025-02-12
Payer: MEDICAID

## 2025-02-13 ENCOUNTER — HOSPITAL ENCOUNTER (OUTPATIENT)
Dept: RADIOLOGY | Facility: HOSPITAL | Age: 45
End: 2025-02-13
Payer: COMMERCIAL